# Patient Record
Sex: FEMALE | Race: OTHER | NOT HISPANIC OR LATINO
[De-identification: names, ages, dates, MRNs, and addresses within clinical notes are randomized per-mention and may not be internally consistent; named-entity substitution may affect disease eponyms.]

---

## 2017-06-02 ENCOUNTER — APPOINTMENT (OUTPATIENT)
Dept: ORTHOPEDIC SURGERY | Facility: CLINIC | Age: 52
End: 2017-06-02

## 2017-06-02 VITALS
DIASTOLIC BLOOD PRESSURE: 70 MMHG | WEIGHT: 122 LBS | BODY MASS INDEX: 21.62 KG/M2 | HEART RATE: 67 BPM | SYSTOLIC BLOOD PRESSURE: 103 MMHG | HEIGHT: 63 IN

## 2017-06-02 DIAGNOSIS — Z82.61 FAMILY HISTORY OF ARTHRITIS: ICD-10-CM

## 2017-06-02 DIAGNOSIS — M77.8 OTHER ENTHESOPATHIES, NOT ELSEWHERE CLASSIFIED: ICD-10-CM

## 2017-06-02 RX ORDER — CYANOCOBALAMIN (VITAMIN B-12) 1000 MCG
TABLET ORAL
Refills: 0 | Status: ACTIVE | COMMUNITY

## 2017-06-02 RX ORDER — ERGOCALCIFEROL 1.25 MG/1
1.25 MG CAPSULE, LIQUID FILLED ORAL
Qty: 4 | Refills: 0 | Status: ACTIVE | COMMUNITY
Start: 2016-12-21

## 2018-01-19 ENCOUNTER — EMERGENCY (EMERGENCY)
Facility: HOSPITAL | Age: 53
LOS: 1 days | Discharge: HOME | End: 2018-01-19
Attending: EMERGENCY MEDICINE | Admitting: EMERGENCY MEDICINE
Payer: COMMERCIAL

## 2018-01-19 VITALS
DIASTOLIC BLOOD PRESSURE: 74 MMHG | RESPIRATION RATE: 18 BRPM | TEMPERATURE: 100 F | OXYGEN SATURATION: 98 % | SYSTOLIC BLOOD PRESSURE: 122 MMHG | HEART RATE: 50 BPM

## 2018-01-19 PROCEDURE — 99284 EMERGENCY DEPT VISIT MOD MDM: CPT

## 2018-01-19 PROCEDURE — 99283 EMERGENCY DEPT VISIT LOW MDM: CPT | Mod: 25

## 2018-01-19 PROCEDURE — 71046 X-RAY EXAM CHEST 2 VIEWS: CPT

## 2018-01-19 PROCEDURE — 71046 X-RAY EXAM CHEST 2 VIEWS: CPT | Mod: 26

## 2018-01-19 RX ORDER — IBUPROFEN 200 MG
600 TABLET ORAL ONCE
Qty: 0 | Refills: 0 | Status: COMPLETED | OUTPATIENT
Start: 2018-01-19 | End: 2018-01-19

## 2018-01-19 RX ORDER — ACETAMINOPHEN 500 MG
975 TABLET ORAL ONCE
Qty: 0 | Refills: 0 | Status: COMPLETED | OUTPATIENT
Start: 2018-01-19 | End: 2018-01-19

## 2018-01-19 NOTE — ED PROVIDER NOTE - PHYSICAL EXAMINATION
NAD, VSS, + Febrile, No sinus tender, Normal throat, No lymphadenopathy, Lungs clear, Neuro- intact.

## 2018-01-19 NOTE — ED PROVIDER NOTE - OBJECTIVE STATEMENT
51yo female pt with PMHx of Asthma (No hospitalization) c/o cough, sinus pressure and fever since 2days ago. Pt stated mild cold symptoms with cough started on 1/4/18 and lasting about 1 week and she was evaluated by PMD (No meds). The symptoms restarted with fever (100.1) on last Tuesday and on Tamiflu with improvement by PMD since 2days ago. Today she felt fever again (100) with sinus pressure. Never took Tylenol or Motrin. Denies dizziness or N/V. Denies CP/SOB/ wheezing/ABD pain. Denies sensory changes or weakness to extremities. Denies urinary problems.

## 2018-01-19 NOTE — ED PROVIDER NOTE - ATTENDING CONTRIBUTION TO CARE
Attending MD Quintana: I personally have seen and examined this patient.  NP note reviewed and agree on plan of care and except where noted.  See below for details.    52F with PMH asthma presents to the ED with cough and fever.  Reports that initially these symptoms started 1/4/18 with sore throat, cough, runny nose.  Reports thought it was a cold that resolved without intervention in a few days.  Reports saw PMD who did not prescribe anything then.  Reports this Tuesday developed fever, worsening cough.  Reports went to PMD on Wednesday who prescribed Tamiflu and she has been taking the medication since.  Reports that today she felt sinus pressure, continued with nonproductive cough and had fever.  Denies wheezing.  Denies shortness of breath.  Denies ever intubation or hospitalization for asthma.  Reports this does not feel like her asthma exacerbations.  Reports that the reason she came in today is because she initially felt better after getting her first dose of Tamiflu but then she thinks she worsened since then so wants to make sure she actually has the flu.  Denies dysuria, hematuria, change in urinary habits including frequency, urgency. Denies abdominal pain, nausea, vomiting, diarrhea, blood in stools. Denies chest pain, palpitations. On exam, NAD, head NCAT, PERRL, FROM at neck, no tenderness to palpation or stepoffs along length of spine, lungs mostly CTAB with rare crackle with good inspiratory effort, +S1S2, no m/r/g, abdomen soft with +BS, NT, moving all extremities; A/P: 52F with suspected viral URI.  Explained given that she is already on Tamiflu, RVP will not change my management of her case and so will not be obtaining.  Explained will obtain CXR to make sure no underlying PNA although low suspicion.  Offered nebs, declined, reports does not feel like her asthma is exacerbated at this time.  Explained that if CXR she should continue her course of Tamiflu as prescribed by her PMD and return to see him in 24-48hrs.  Follow up instructions given, importance of follow up emphasized, return to ED parameters reviewed and patient verbalized understanding.

## 2018-07-11 ENCOUNTER — APPOINTMENT (OUTPATIENT)
Dept: SURGERY | Facility: CLINIC | Age: 53
End: 2018-07-11
Payer: COMMERCIAL

## 2018-07-11 PROCEDURE — 99205K: CUSTOM

## 2018-07-31 ENCOUNTER — APPOINTMENT (OUTPATIENT)
Dept: SURGICAL ONCOLOGY | Facility: CLINIC | Age: 53
End: 2018-07-31

## 2018-08-22 ENCOUNTER — APPOINTMENT (OUTPATIENT)
Dept: RADIATION ONCOLOGY | Facility: CLINIC | Age: 53
End: 2018-08-22
Payer: COMMERCIAL

## 2018-08-22 ENCOUNTER — OUTPATIENT (OUTPATIENT)
Dept: OUTPATIENT SERVICES | Facility: HOSPITAL | Age: 53
LOS: 1 days | Discharge: HOME | End: 2018-08-22
Payer: COMMERCIAL

## 2018-08-22 VITALS
TEMPERATURE: 97.9 F | OXYGEN SATURATION: 99 % | HEART RATE: 67 BPM | RESPIRATION RATE: 16 BRPM | BODY MASS INDEX: 21.09 KG/M2 | HEIGHT: 63 IN | WEIGHT: 119 LBS | SYSTOLIC BLOOD PRESSURE: 111 MMHG | DIASTOLIC BLOOD PRESSURE: 73 MMHG

## 2018-08-22 PROCEDURE — 77263 THER RADIOLOGY TX PLNG CPLX: CPT

## 2018-08-22 PROCEDURE — 99244 OFF/OP CNSLTJ NEW/EST MOD 40: CPT | Mod: 25

## 2018-08-22 RX ORDER — ALBUTEROL SULFATE 108 UG/1
108 (90 BASE) AEROSOL, METERED RESPIRATORY (INHALATION)
Qty: 67 | Refills: 0 | Status: DISCONTINUED | COMMUNITY
Start: 2017-04-13 | End: 2018-08-22

## 2018-08-22 RX ORDER — TERCONAZOLE 8 MG/G
0.8 CREAM VAGINAL
Qty: 20 | Refills: 0 | Status: DISCONTINUED | COMMUNITY
Start: 2016-12-20 | End: 2018-08-22

## 2018-08-22 RX ORDER — CLONAZEPAM 0.5 MG/1
0.5 TABLET ORAL
Qty: 10 | Refills: 0 | Status: DISCONTINUED | COMMUNITY
Start: 2016-12-15 | End: 2018-08-22

## 2018-08-22 RX ORDER — OSELTAMIVIR PHOSPHATE 75 MG/1
75 CAPSULE ORAL
Qty: 10 | Refills: 0 | Status: DISCONTINUED | COMMUNITY
Start: 2016-12-07 | End: 2018-08-22

## 2018-08-22 RX ORDER — FLUCONAZOLE 150 MG/1
150 TABLET ORAL
Qty: 2 | Refills: 0 | Status: DISCONTINUED | COMMUNITY
Start: 2016-12-20 | End: 2018-08-22

## 2018-08-22 RX ORDER — CIPROFLOXACIN HYDROCHLORIDE 500 MG/1
500 TABLET, FILM COATED ORAL
Qty: 20 | Refills: 0 | Status: DISCONTINUED | COMMUNITY
Start: 2016-12-15 | End: 2018-08-22

## 2018-08-29 PROCEDURE — 77333 RADIATION TREATMENT AID(S): CPT | Mod: 26

## 2018-08-29 PROCEDURE — 77290 THER RAD SIMULAJ FIELD CPLX: CPT | Mod: 26

## 2018-09-05 PROCEDURE — 77334 RADIATION TREATMENT AID(S): CPT | Mod: 26

## 2018-09-05 PROCEDURE — 77295 3-D RADIOTHERAPY PLAN: CPT | Mod: 26

## 2018-09-05 PROCEDURE — 77300 RADIATION THERAPY DOSE PLAN: CPT | Mod: 26

## 2018-09-07 PROCEDURE — 77280 THER RAD SIMULAJ FIELD SMPL: CPT | Mod: 26

## 2018-09-10 PROCEDURE — G6017: CPT

## 2018-09-11 PROCEDURE — G6017: CPT

## 2018-09-12 PROCEDURE — G6017: CPT

## 2018-09-13 PROCEDURE — G6017: CPT

## 2018-09-13 NOTE — REVIEW OF SYSTEMS
[Alopecia: Grade 0] : Alopecia: Grade 0 [Pruritus: Grade 0] : Pruritus: Grade 0 [Skin Atrophy: Grade 0] : Skin Atrophy: Grade 0 [Skin Hyperpigmentation: Grade 0] : Skin Hyperpigmentation: Grade 0 [Skin Induration: Grade 0] : Skin Induration: Grade 0 [Dermatitis Radiation: Grade 0] : Dermatitis Radiation: Grade 0

## 2018-09-14 PROCEDURE — G6017: CPT

## 2018-09-14 PROCEDURE — 77427 RADIATION TX MANAGEMENT X5: CPT

## 2018-09-16 NOTE — DISEASE MANAGEMENT
[Pathological] : TNM Stage: p [IA] : IA [TTNM] : 1c [NTNM] : 0 [MTNM] : 0 [de-identified] : 1050 cGy [de-identified] : 5240 cGy [de-identified] : left breast

## 2018-09-16 NOTE — PHYSICAL EXAM
[Normal] : well developed, well nourished, in no acute distress [de-identified] : left breast lumpectomy scar well-healed, minimal erythema

## 2018-09-16 NOTE — REASON FOR VISIT
[Routine On-Treatment] : a routine on-treatment visit for [Breast Cancer] : breast cancer [Spouse] : spouse

## 2018-09-16 NOTE — HISTORY OF PRESENT ILLNESS
[FreeTextEntry1] : Ms. Loya is a 53 year-old woman with stage IA, [id number] grade 3 mammary carcinoma with mixed ductal and lobular features, hormone receptor positive and Oncotype Dx score of 11. She underwent lumpectomy with negative margins and had a negative sentinel lymph node biopsy. She is currently receiving adjuvant radiation therapy.\par \par She denies breast pain, but endorses an intermittent tingling sensation. She also endorses fatigue. She denies pruritus and discharge. She is using Aquaphor on the skin.\par \par

## 2018-09-17 ENCOUNTER — OUTPATIENT (OUTPATIENT)
Dept: OUTPATIENT SERVICES | Facility: HOSPITAL | Age: 53
LOS: 1 days | Discharge: HOME | End: 2018-09-17
Payer: COMMERCIAL

## 2018-09-17 DIAGNOSIS — C50.919 MALIGNANT NEOPLASM OF UNSPECIFIED SITE OF UNSPECIFIED FEMALE BREAST: ICD-10-CM

## 2018-09-17 PROCEDURE — G6017: CPT

## 2018-09-18 ENCOUNTER — OTHER (OUTPATIENT)
Age: 53
End: 2018-09-18

## 2018-09-18 PROCEDURE — G6017: CPT

## 2018-09-19 PROCEDURE — G6017: CPT

## 2018-09-20 ENCOUNTER — OTHER (OUTPATIENT)
Age: 53
End: 2018-09-20

## 2018-09-20 PROCEDURE — G6017: CPT

## 2018-09-20 NOTE — VITALS
[Maximal Pain Intensity: 0/10] : 0/10 [Least Pain Intensity: 0/10] : 0/10 [Pain Description/Quality: ___] : Pain description/quality: [unfilled] [NoTreatment Scheduled] : no treatment scheduled [90: Able to carry normal activity; minor signs or symptoms of disease.] : 90: Able to carry normal activity; minor signs or symptoms of disease.  [ECOG Performance Status: 0 - Fully active, able to carry on all pre-disease performance without restriction] : Performance Status: 0 - Fully active, able to carry on all pre-disease performance without restriction

## 2018-09-21 ENCOUNTER — RESULT REVIEW (OUTPATIENT)
Age: 53
End: 2018-09-21

## 2018-09-21 PROCEDURE — 88321 CONSLTJ&REPRT SLD PREP ELSWR: CPT

## 2018-09-21 PROCEDURE — 77427 RADIATION TX MANAGEMENT X5: CPT

## 2018-09-21 PROCEDURE — G6017: CPT

## 2018-09-21 NOTE — DISEASE MANAGEMENT
[Pathological] : TNM Stage: p [IA] : IA [TTNM] : 1c [NTNM] : 0 [MTNM] : 0 [de-identified] : [id number] [de-identified] : 5240 cGy [de-identified] : left breast

## 2018-09-21 NOTE — HISTORY OF PRESENT ILLNESS
[FreeTextEntry1] : Ms. Loya is a 53 year-old woman with stage IA, [id number] grade 3 mammary carcinoma with mixed ductal and lobular features, hormone receptor positive and Oncotype Dx score of 11. She underwent lumpectomy with negative margins and had a negative sentinel lymph node biopsy. She is currently receiving adjuvant radiation therapy.\par \par She reports intermittent sharp pain in her breast, as well as breast swelling and skin irritation. She also reports occasional numbness in her left upper arm. Her fatigue is improved compared to last week. She is very worried about getting lymphedema.\par \par \par

## 2018-09-21 NOTE — REVIEW OF SYSTEMS
[Alopecia: Grade 0] : Alopecia: Grade 0 [Pruritus: Grade 0] : Pruritus: Grade 0 [Skin Atrophy: Grade 0] : Skin Atrophy: Grade 0 [Skin Hyperpigmentation: Grade 1 - Hyperpigmentation covering <10% BSA; no psychosocial impact] : Skin Hyperpigmentation: Grade 1 - Hyperpigmentation covering <10% BSA; no psychosocial impact [Skin Induration: Grade 0] : Skin Induration: Grade 0 [Dermatitis Radiation: Grade 1 - Faint erythema or dry desquamation] : Dermatitis Radiation: Grade 1 - Faint erythema or dry desquamation

## 2018-09-21 NOTE — PHYSICAL EXAM
[Normal] : well developed, well nourished, in no acute distress [de-identified] : left breast with mild skin erythema and dryness that is mildly tender to the touch, no rash or desquamation [de-identified] : a

## 2018-09-24 ENCOUNTER — APPOINTMENT (OUTPATIENT)
Dept: HEMATOLOGY ONCOLOGY | Facility: CLINIC | Age: 53
End: 2018-09-24
Payer: COMMERCIAL

## 2018-09-24 VITALS
OXYGEN SATURATION: 100 % | SYSTOLIC BLOOD PRESSURE: 110 MMHG | RESPIRATION RATE: 16 BRPM | HEART RATE: 73 BPM | WEIGHT: 120 LBS | TEMPERATURE: 98.2 F | HEIGHT: 62.5 IN | DIASTOLIC BLOOD PRESSURE: 67 MMHG | BODY MASS INDEX: 21.53 KG/M2

## 2018-09-24 DIAGNOSIS — L58.9 RADIODERMATITIS, UNSPECIFIED: ICD-10-CM

## 2018-09-24 DIAGNOSIS — Z83.3 FAMILY HISTORY OF DIABETES MELLITUS: ICD-10-CM

## 2018-09-24 DIAGNOSIS — Z80.9 FAMILY HISTORY OF MALIGNANT NEOPLASM, UNSPECIFIED: ICD-10-CM

## 2018-09-24 DIAGNOSIS — Z80.0 FAMILY HISTORY OF MALIGNANT NEOPLASM OF DIGESTIVE ORGANS: ICD-10-CM

## 2018-09-24 PROCEDURE — G6017: CPT

## 2018-09-24 PROCEDURE — 99245 OFF/OP CONSLTJ NEW/EST HI 55: CPT

## 2018-09-24 RX ORDER — CLOTRIMAZOLE AND BETAMETHASONE DIPROPIONATE 10; .5 MG/G; MG/G
1-0.05 CREAM TOPICAL
Qty: 15 | Refills: 0 | Status: DISCONTINUED | COMMUNITY
Start: 2017-03-13 | End: 2018-09-24

## 2018-09-25 PROCEDURE — G6017: CPT

## 2018-09-25 PROCEDURE — 77280 THER RAD SIMULAJ FIELD SMPL: CPT | Mod: 26

## 2018-09-26 PROCEDURE — G6017: CPT

## 2018-09-27 PROCEDURE — G6017: CPT

## 2018-09-27 NOTE — VITALS
[Maximal Pain Intensity: 3/10] : 3/10 [Least Pain Intensity: 0/10] : 0/10 [Pain Description/Quality: ___] : Pain description/quality: [unfilled] [Pain Duration: ___] : Pain duration: [unfilled] [Pain Location: ___] : Pain Location: [unfilled] [Pain Interferes with ADLs] : Pain interferes with activities of daily living. [NoTreatment Scheduled] : no treatment scheduled [90: Able to carry normal activity; minor signs or symptoms of disease.] : 90: Able to carry normal activity; minor signs or symptoms of disease.  [ECOG Performance Status: 0 - Fully active, able to carry on all pre-disease performance without restriction] : Performance Status: 0 - Fully active, able to carry on all pre-disease performance without restriction

## 2018-09-28 PROCEDURE — 77427 RADIATION TX MANAGEMENT X5: CPT

## 2018-09-28 PROCEDURE — G6017: CPT

## 2018-09-29 NOTE — HISTORY OF PRESENT ILLNESS
[FreeTextEntry1] : Ms. Loya is a 53 year-old woman with stage IA, [id number] grade 3 mammary carcinoma with mixed ductal and lobular features, hormone receptor positive and Oncotype Dx score of 11. She underwent lumpectomy with negative margins and had a negative sentinel lymph node biopsy. She is currently receiving adjuvant radiation therapy.\par \par She reports intermittent sharp pain in her breast, as well as breast swelling and skin irritation. She is using Hydrocortisone cream to the rash on the upper chest. She also reports occasional numbness in her left upper arm. Her fatigue is improved compared to last week. \par \par \par

## 2018-09-29 NOTE — DISEASE MANAGEMENT
[Pathological] : TNM Stage: p [IA] : IA [TTNM] : 1c [NTNM] : 0 [MTNM] : 0 [de-identified] : [id number] [de-identified] : 5240 cGy [de-identified] : left breast

## 2018-09-29 NOTE — PHYSICAL EXAM
[Normal] : well developed, well nourished, in no acute distress [de-identified] : left breast with mild to moderate skin erythema and dryness that is mildly tender to the touch, no rash or desquamation

## 2018-10-01 PROCEDURE — G6017: CPT

## 2018-10-02 PROCEDURE — 77321 SPECIAL TELETX PORT PLAN: CPT | Mod: 26

## 2018-10-02 PROCEDURE — 77334 RADIATION TREATMENT AID(S): CPT | Mod: 26

## 2018-10-04 DIAGNOSIS — G47.00 INSOMNIA, UNSPECIFIED: ICD-10-CM

## 2018-10-04 NOTE — DISCUSSION/SUMMARY
[Diagnosis Date (year): ____] : Diagnosis Date (year): [unfilled] [Surgery] : Surgery: Yes [Surgery Date(s) (year): ____] : Surgery Date(s) (year): [unfilled] [Radiation] : Radiation: Yes [Body Area Treated: _________] : Body Area Treated: [unfilled] [End Date (year): ____] : End Date (year): [unfilled] [Follow up with Radiation MD in _____] : Follow up with Radiation MD in [unfilled] [Mammogram] : Mammogram [Skin Checks] : skin checks [Fatigue] : Fatigue [Sun screen use] : Sun screen use [Physical activity] : Physical activity [Bridge to Survivorship Breast Cancer] : Bridge to Survivorship Breast Cancer [FreeTextEntry8] : Iman Scott [FreeTextEntry9] : 10/4/18

## 2018-10-04 NOTE — DISEASE MANAGEMENT
[Pathological] : TNM Stage: p [IA] : IA [TTNM] : 1c [NTNM] : 0 [MTNM] : 0 [de-identified] : [id number] [de-identified] : 5240 cGy [de-identified] : left breast

## 2018-10-04 NOTE — VITALS
[Maximal Pain Intensity: 3/10] : 3/10 [Least Pain Intensity: 0/10] : 0/10 [Pain Description/Quality: ___] : Pain description/quality: [unfilled] [Pain Duration: ___] : Pain duration: [unfilled] [Pain Location: ___] : Pain Location: [unfilled] [Pain Interferes with ADLs] : Pain interferes with activities of daily living. [NoTreatment Scheduled] : no treatment scheduled [80: Normal activity with effort; some signs or symptoms of disease.] : 80: Normal activity with effort; some signs or symptoms of disease.  [ECOG Performance Status: 0 - Fully active, able to carry on all pre-disease performance without restriction] : Performance Status: 0 - Fully active, able to carry on all pre-disease performance without restriction

## 2018-10-04 NOTE — REVIEW OF SYSTEMS
[Alopecia: Grade 0] : Alopecia: Grade 0 [Pruritus: Grade 1 - Mild or localized; topical intervention indicated] : Pruritus: Grade 1 - Mild or localized; topical intervention indicated [Skin Atrophy: Grade 0] : Skin Atrophy: Grade 0 [Skin Hyperpigmentation: Grade 1 - Hyperpigmentation covering <10% BSA; no psychosocial impact] : Skin Hyperpigmentation: Grade 1 - Hyperpigmentation covering <10% BSA; no psychosocial impact [Skin Induration: Grade 0] : Skin Induration: Grade 0 [Dermatitis Radiation: Grade 1 - Faint erythema or dry desquamation] : Dermatitis Radiation: Grade 1 - Faint erythema or dry desquamation

## 2018-10-04 NOTE — PHYSICAL EXAM
[Normal] : well developed, well nourished, in no acute distress [de-identified] : left breast with mild to moderate skin erythema, mild hyperpigmentation, mild rash, no desquamation

## 2018-10-04 NOTE — HISTORY OF PRESENT ILLNESS
[FreeTextEntry1] : Ms. Loya is a 53 year-old woman with stage IA, [id number] grade 3 mammary carcinoma with mixed ductal and lobular features, hormone receptor positive and Oncotype Dx score of 11. She underwent lumpectomy with negative margins and had a negative sentinel lymph node biopsy. She is currently receiving adjuvant radiation therapy.\par \par She has been feeling quite fatigue last two days and today has a headache. She had 4 nights of poor sleep this past week, only about 3 hrs per night. Her skin is a little better this week, less red and irritated. She is using Hydrocortisone cream and Aquaphor.

## 2018-10-05 PROCEDURE — 77427 RADIATION TX MANAGEMENT X5: CPT

## 2018-10-11 ENCOUNTER — RESULT REVIEW (OUTPATIENT)
Age: 53
End: 2018-10-11

## 2018-10-11 PROCEDURE — 88321 CONSLTJ&REPRT SLD PREP ELSWR: CPT

## 2018-11-28 ENCOUNTER — APPOINTMENT (OUTPATIENT)
Dept: RADIATION ONCOLOGY | Facility: CLINIC | Age: 53
End: 2018-11-28
Payer: COMMERCIAL

## 2018-11-28 VITALS
HEART RATE: 69 BPM | WEIGHT: 119 LBS | SYSTOLIC BLOOD PRESSURE: 106 MMHG | DIASTOLIC BLOOD PRESSURE: 68 MMHG | BODY MASS INDEX: 21.9 KG/M2 | RESPIRATION RATE: 16 BRPM | HEIGHT: 62 IN

## 2018-11-28 PROCEDURE — 99024 POSTOP FOLLOW-UP VISIT: CPT | Mod: GC

## 2018-11-29 NOTE — HISTORY OF PRESENT ILLNESS
[FreeTextEntry1] : Ms. Loya is a 53 year-old woman with stage IA, [id number] grade 3 mammary carcinoma with mixed ductal and lobular features, hormone receptor positive and Oncotype Dx score of 11. She underwent lumpectomy with negative margins and had a negative sentinel lymph node biopsy. She is s/p 5240 cGy to the left breast completed on 10/5/18. She presents for post treatment visit. \par \par The patient reports feeling generally well. She is involved in her usual activities. She remains active and exercises. She denies cough or shortness of breath.  There has been some improvement in the skin since completing radiation therapy. She continues regular skin care. She does not require analgesics. She had pain in her breast for two day after a yoga class, but it resolved. She started tamoxifen and is tolerating it well, except that she has noted changes in her hair, thinner and different texture. \par

## 2018-11-29 NOTE — DISEASE MANAGEMENT
[Pathological] : TNM Stage: p [IA] : IA [TTNM] : 1c [NTNM] : 0 [MTNM] : 0 [de-identified] : [id number] [de-identified] : 5240 cGy [de-identified] : left breast

## 2018-11-29 NOTE — PHYSICAL EXAM
[Breast Palpation Mass] : no palpable masses [Breast Abnormal Lactation (Galactorrhea)] : no nipple discharge [No UE Edema] : there is no upper extremity edema [Normal] : no joint swelling, no clubbing or cyanosis of the fingernails and muscle strength and tone were normal [de-identified] : left breast well healed scars without edema, mild residual hyperpigmentation and edema

## 2018-11-29 NOTE — VITALS
[Maximal Pain Intensity: 3/10] : 3/10 [Least Pain Intensity: 0/10] : 0/10 [Pain Description/Quality: ___] : Pain description/quality: [unfilled] [Pain Duration: ___] : Pain duration: [unfilled] [Pain Location: ___] : Pain Location: [unfilled] [NoTreatment Scheduled] : no treatment scheduled [90: Able to carry normal activity; minor signs or symptoms of disease.] : 90: Able to carry normal activity; minor signs or symptoms of disease.  [ECOG Performance Status: 0 - Fully active, able to carry on all pre-disease performance without restriction] : Performance Status: 0 - Fully active, able to carry on all pre-disease performance without restriction [Pain Interferes with ADLs] : Pain does not interfere with activities of daily living

## 2018-11-30 ENCOUNTER — MESSAGE (OUTPATIENT)
Age: 53
End: 2018-11-30

## 2018-12-04 ENCOUNTER — OUTPATIENT (OUTPATIENT)
Dept: OUTPATIENT SERVICES | Facility: HOSPITAL | Age: 53
LOS: 1 days | Discharge: HOME | End: 2018-12-04

## 2018-12-04 DIAGNOSIS — C50.919 MALIGNANT NEOPLASM OF UNSPECIFIED SITE OF UNSPECIFIED FEMALE BREAST: ICD-10-CM

## 2018-12-07 ENCOUNTER — APPOINTMENT (OUTPATIENT)
Dept: HEMATOLOGY ONCOLOGY | Facility: CLINIC | Age: 53
End: 2018-12-07
Payer: COMMERCIAL

## 2018-12-07 VITALS
TEMPERATURE: 98.3 F | HEART RATE: 72 BPM | BODY MASS INDEX: 21.57 KG/M2 | DIASTOLIC BLOOD PRESSURE: 73 MMHG | SYSTOLIC BLOOD PRESSURE: 110 MMHG | RESPIRATION RATE: 17 BRPM | OXYGEN SATURATION: 99 % | WEIGHT: 117.95 LBS

## 2018-12-07 DIAGNOSIS — R20.0 ANESTHESIA OF SKIN: ICD-10-CM

## 2018-12-07 DIAGNOSIS — L65.9 NONSCARRING HAIR LOSS, UNSPECIFIED: ICD-10-CM

## 2018-12-07 PROCEDURE — 99215 OFFICE O/P EST HI 40 MIN: CPT

## 2018-12-07 RX ORDER — FEXOFENADINE HYDROCHLORIDE 180 MG/1
180 TABLET, FILM COATED ORAL
Refills: 0 | Status: ACTIVE | COMMUNITY

## 2018-12-07 RX ORDER — WHITE PETROLATUM 1.75 OZ
OINTMENT TOPICAL
Refills: 0 | Status: DISCONTINUED | COMMUNITY
Start: 2018-09-24 | End: 2018-12-07

## 2018-12-07 NOTE — REVIEW OF SYSTEMS
[Anxiety] : anxiety [Negative] : Allergic/Immunologic [FreeTextEntry2] : More fatigue, wakes up feeling she needs more sleep. had flu vaccination 10/23/2018. [FreeTextEntry7] : Most recent colonoscopy 2014 [FreeTextEntry8] : LMP 10  days ago, last period was 1 week early. Most recent GYN exam 7/2018. [de-identified] : Hair loss, see interval history. [de-identified] : Intermittent numbness right leg  started a few days ago. Also had transitory numbness of right arm at different times.

## 2018-12-07 NOTE — PHYSICAL EXAM
[Fully active, able to carry on all pre-disease performance without restriction] : Status 0 - Fully active, able to carry on all pre-disease performance without restriction [Normal] : affect appropriate [Thin] : thin [de-identified] : Right breast: no mass. Left breast: healing incisions inferior margin areola and left axilla, tender, mild hyperpigmentation, no mass. [de-identified] : No gross abnormalities in hair.

## 2018-12-07 NOTE — HISTORY OF PRESENT ILLNESS
[Disease: _____________________] : Disease: [unfilled] [T: ___] : T[unfilled] [N: ___] : N[unfilled] [M: ___] : M[unfilled] [AJCC Stage: ____] : AJCC Stage: [unfilled] [Date: ____________] : Patient's last distress assessment performed on [unfilled]. [10 - Extreme Distress] : Distress Level: 10 [Referred Patient  to social work for follow-up] : Patient was referred to social work for follow-up [de-identified] : The patient presented in 2018, at age 52, with an abnormal screening mammogram.\par \par Routine mammogram/breast ultrasound performed on 2018 at [facility] demonstrated distortion in the left  retroareolar region which corresponded to a 0.8 cm hypoechoic nodule at the 4:00 position of the left breast. Ultrasound-guided core biopsy was performed on 2018 and was positive for infiltrating carcinoma with ductal and lobular features, Panaca score 7/9, ER positive (%), CT positive (%) HER-2/belem 2+ by IHC and HER-2/belem negative by FISH.\par \par Breast MRI on 7/10/2018 demonstrated a 1.2 cm mass in the left breast at the site of her known cancer and was negative for other suspicious findings\par \par Dr. Ella Herrera performed a left lumpectomy/sentinel lymph node biopsy at [facility] on 2018. Pathology reported a 20 mm moderately to poorly differentiated mixed ductal and lobular infiltrating carcinoma with 3 negative sentinel lymph nodes. The final margins were negative. Oncotype DX recurrence score was 11. The patient had an uneventful postoperative course.\par \par The patient initiated radiation therapy under the supervision of Dr. Latrice Nation on 9/10/2018. \par \par Risk factors:\par Prior breast disease: None, no biopsies. Menarche: Age 15. The patient is premenopausal with her LMP 2 weeks prior to this consultation, and her  4 weeks before that. She has not experienced any vasomotor symptoms. The patient is  2 para 2002 with her first childbirth at age 34. She breast-fed both her children for 4 months. Oral contraceptive use: Age 22 or 23 for approximately one year. IUD: None. Other hormone exposure: None. Family history is negative for cancer of the breast, ovary, endometrium, and prostate. Family history is positive for colorectal cancer in her maternal grandmother (age 72) and maternal aunt (age 54). The patient is not aware of any personal or family history of colon polyps. The patient patient's maternal grandfather had "stomach cancer" in his early 60s. The patient's father who, although a nonsmoker, was exposed to passive cigarette smoke, developed lung cancer in his early 60s. A paternal aunt possibly had melanoma in her 70s. Her paternal grandfather  of an unknown type of cancer in his early 60s. The patient is Russian ethnic background on the paternal side and an Ashkenazi Scientologist background on the maternal side. The patient underwent genetic counseling and testing at [facility] which was negative for deleterious mutations and variants. The testing done by [facility] consisted of their Breast Cancer STAT Panel, Breast and Gyn Cancers Panel, Colorectal Cancers Panel, DARYN and CHEK2. [de-identified] : Infiltrating carcinoma with ductal and lobular features, moderately to poorly differentiated, ER positive, DE positive, HER-2/belem negative, Oncotype DX recurrence score 11. [de-identified] : The patient is 5 months post diagnosis of breast cancer.\par \par She completed RT  10/5/2018. Last saw Dr Nation 11/28/2018.\par \par After delay to take a trip she initiated tamoxifen 3 weeks ago. Feels mostly fine except that starting 1 week post initiation of tamoxifen she felt hair was significant thinning.\par \par Feels unusually tired.\par \par Very anxious. patient states she is a fearful person.

## 2018-12-14 ENCOUNTER — MESSAGE (OUTPATIENT)
Age: 53
End: 2018-12-14

## 2018-12-17 ENCOUNTER — RX RENEWAL (OUTPATIENT)
Age: 53
End: 2018-12-17

## 2018-12-19 ENCOUNTER — MESSAGE (OUTPATIENT)
Age: 53
End: 2018-12-19

## 2019-01-07 ENCOUNTER — INBOUND DOCUMENT (OUTPATIENT)
Age: 54
End: 2019-01-07

## 2019-01-09 ENCOUNTER — OUTPATIENT (OUTPATIENT)
Dept: OUTPATIENT SERVICES | Facility: HOSPITAL | Age: 54
LOS: 1 days | Discharge: HOME | End: 2019-01-09

## 2019-01-09 DIAGNOSIS — C50.919 MALIGNANT NEOPLASM OF UNSPECIFIED SITE OF UNSPECIFIED FEMALE BREAST: ICD-10-CM

## 2019-01-24 ENCOUNTER — APPOINTMENT (OUTPATIENT)
Dept: HEMATOLOGY ONCOLOGY | Facility: CLINIC | Age: 54
End: 2019-01-24
Payer: COMMERCIAL

## 2019-01-24 VITALS
HEART RATE: 73 BPM | DIASTOLIC BLOOD PRESSURE: 74 MMHG | RESPIRATION RATE: 16 BRPM | WEIGHT: 120 LBS | OXYGEN SATURATION: 100 % | BODY MASS INDEX: 21.95 KG/M2 | TEMPERATURE: 98.2 F | SYSTOLIC BLOOD PRESSURE: 116 MMHG

## 2019-01-24 PROCEDURE — 99214 OFFICE O/P EST MOD 30 MIN: CPT

## 2019-01-24 NOTE — HISTORY OF PRESENT ILLNESS
[Disease: _____________________] : Disease: [unfilled] [T: ___] : T[unfilled] [N: ___] : N[unfilled] [M: ___] : M[unfilled] [AJCC Stage: ____] : AJCC Stage: [unfilled] [de-identified] : The patient presented in 2018, at age 52, with an abnormal screening mammogram.\par \par Routine mammogram/breast ultrasound performed on 2018 at [facility] demonstrated distortion in the left  retroareolar region which corresponded to a 0.8 cm hypoechoic nodule at the 4:00 position of the left breast. Ultrasound-guided core biopsy was performed on 2018 and was positive for infiltrating carcinoma with ductal and lobular features, McQueeney score 7/9, ER positive (%), TX positive (%) HER-2/belem 2+ by IHC and HER-2/belem negative by FISH.\par \par Breast MRI on 7/10/2018 demonstrated a 1.2 cm mass in the left breast at the site of her known cancer and was negative for other suspicious findings\par \par Dr. Ella Herrera performed a left lumpectomy/sentinel lymph node biopsy at [facility] on 2018. Pathology reported a 20 mm moderately to poorly differentiated mixed ductal and lobular infiltrating carcinoma with 3 negative sentinel lymph nodes. The final margins were negative. Oncotype DX recurrence score was 11. The patient had an uneventful postoperative course.\par \par The patient initiated radiation therapy under the supervision of Dr. Latrice Nation on 9/10/2018. \par \par Risk factors:\par Prior breast disease: None, no biopsies. Menarche: Age 15. The patient is premenopausal with her LMP 2 weeks prior to this consultation, and her  4 weeks before that. She has not experienced any vasomotor symptoms. The patient is  2 para 2002 with her first childbirth at age 34. She breast-fed both her children for 4 months. Oral contraceptive use: Age 22 or 23 for approximately one year. IUD: None. Other hormone exposure: None. Family history is negative for cancer of the breast, ovary, endometrium, and prostate. Family history is positive for colorectal cancer in her maternal grandmother (age 72) and maternal aunt (age 54). The patient is not aware of any personal or family history of colon polyps. The patient patient's maternal grandfather had "stomach cancer" in his early 60s. The patient's father who, although a nonsmoker, was exposed to passive cigarette smoke, developed lung cancer in his early 60s. A paternal aunt possibly had melanoma in her 70s. Her paternal grandfather  of an unknown type of cancer in his early 60s. The patient is Stateless ethnic background on the paternal side and an Ashkenazi Alevism background on the maternal side. The patient underwent genetic counseling and testing at [facility] which was negative for deleterious mutations and variants. The testing done by [facility] consisted of their Breast Cancer STAT Panel, Breast and Gyn Cancers Panel, Colorectal Cancers Panel, DARYN and CHEK2. [de-identified] : Infiltrating carcinoma with ductal and lobular features, moderately to poorly differentiated, ER positive, NM positive, HER-2/belem negative, Oncotype DX recurrence score 11. [de-identified] : The patient is 6  months plus  post diagnosis of breast cancer.\par \par Patient discontinued tamoxifen after last visit because of of numbness, was referred t o neurology who felt she did not have TIA or CVA and cleared her to resume tamoxifen.\par \par Saw neurology Dr Robert Henry in neurology regarding transient episode of numbness, contrast brain MRI was ordered.\par \par Resumed it for 10 days and then discontinued it because of trip. REsumed tamoxifen 1/7/2019. Planning to fly to [place] 3/2019.\par \par Feels unusually tired.\par \par Very anxious. patient states she is a fearful person.\par \par Increased moodiness. Saw psychiatrist who recommended Effexor or Lexapro as a second choice.\par \par No other interval events since last seen.

## 2019-01-24 NOTE — PHYSICAL EXAM
[Fully active, able to carry on all pre-disease performance without restriction] : Status 0 - Fully active, able to carry on all pre-disease performance without restriction [Thin] : thin [Normal] : normal appearance, no rash, nodules, vesicles, ulcers, erythema [de-identified] : Right breast: no mass. Left breast:  incisions inferior margin areola and left axilla,, no mass.

## 2019-01-24 NOTE — REVIEW OF SYSTEMS
[Anxiety] : anxiety [Negative] : Allergic/Immunologic [FreeTextEntry2] : Energy level normal, still wakes up at night..Had flu vaccination 10/23/2018. [FreeTextEntry7] : Most recent colonoscopy 2014 [FreeTextEntry8] : LMP late 12/2018. No vasomotor symptoms Most recent GYN exam 7/2018. [de-identified] : Hair loss " a nightmare". [de-identified] : See interval history.

## 2019-02-28 ENCOUNTER — OUTPATIENT (OUTPATIENT)
Dept: OUTPATIENT SERVICES | Facility: HOSPITAL | Age: 54
LOS: 1 days | Discharge: HOME | End: 2019-02-28

## 2019-02-28 DIAGNOSIS — C50.919 MALIGNANT NEOPLASM OF UNSPECIFIED SITE OF UNSPECIFIED FEMALE BREAST: ICD-10-CM

## 2019-03-14 ENCOUNTER — APPOINTMENT (OUTPATIENT)
Dept: HEMATOLOGY ONCOLOGY | Facility: CLINIC | Age: 54
End: 2019-03-14
Payer: COMMERCIAL

## 2019-03-14 VITALS
DIASTOLIC BLOOD PRESSURE: 76 MMHG | TEMPERATURE: 98.2 F | WEIGHT: 125 LBS | RESPIRATION RATE: 16 BRPM | SYSTOLIC BLOOD PRESSURE: 139 MMHG | HEART RATE: 72 BPM | OXYGEN SATURATION: 99 % | BODY MASS INDEX: 22.86 KG/M2

## 2019-03-14 DIAGNOSIS — R10.31 RIGHT LOWER QUADRANT PAIN: ICD-10-CM

## 2019-03-14 PROCEDURE — 99214 OFFICE O/P EST MOD 30 MIN: CPT

## 2019-03-14 RX ORDER — CLONAZEPAM 0.5 MG/1
0.5 TABLET ORAL 3 TIMES DAILY
Qty: 30 | Refills: 0 | Status: DISCONTINUED | COMMUNITY
Start: 2018-10-04 | End: 2019-03-14

## 2019-03-14 NOTE — REVIEW OF SYSTEMS
[Anxiety] : anxiety [Negative] : Allergic/Immunologic [FreeTextEntry2] : Energy decreased, does all normal activites and is exercising more..Had flu vaccination 10/23/2018. [FreeTextEntry7] : Most recent colonoscopy 2014 [FreeTextEntry8] : LMP late 2/2019,  late 12/2018. No vasomotor symptoms Most recent GYN exam 7/2018. [de-identified] : Hair still falling out [de-identified] : See interval history.

## 2019-03-14 NOTE — PHYSICAL EXAM
[Fully active, able to carry on all pre-disease performance without restriction] : Status 0 - Fully active, able to carry on all pre-disease performance without restriction [Thin] : thin [Normal] : affect appropriate [de-identified] : Right breast: no mass. Left breast:  incisions inferior margin areola and left axilla,, no mass.

## 2019-03-14 NOTE — HISTORY OF PRESENT ILLNESS
[Disease: _____________________] : Disease: [unfilled] [T: ___] : T[unfilled] [N: ___] : N[unfilled] [M: ___] : M[unfilled] [AJCC Stage: ____] : AJCC Stage: [unfilled] [de-identified] : The patient presented in 2018, at age 52, with an abnormal screening mammogram.\par \par Routine mammogram/breast ultrasound performed on 2018 at [facility] demonstrated distortion in the left  retroareolar region which corresponded to a 0.8 cm hypoechoic nodule at the 4:00 position of the left breast. 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 Ella Herrera performed a left lumpectomy/sentinel lymph node biopsy at [facility] on 2018. Pathology reported a 20 mm moderately to poorly differentiated mixed ductal and lobular infiltrating carcinoma with 3 negative sentinel lymph nodes. The final margins were negative. Oncotype DX recurrence score was 11. The patient had an uneventful postoperative course.\par \par The patient received radiation therapy under the supervision of Dr. Latrice Nation.\par \par Risk factors:\par Prior breast disease: None, no biopsies. Menarche: Age 15. The patient is premenopausal with her LMP 2 weeks prior to this consultation, and her  4 weeks before that. She has not experienced any vasomotor symptoms. The patient is  2 para 2002 with her first childbirth at age 34. She breast-fed both her children for 4 months. Oral contraceptive use: Age 22 or 23 for approximately one year. IUD: None. Other hormone exposure: None. Family history is negative for cancer of the breast, ovary, endometrium, and prostate. Family history is positive for colorectal cancer in her maternal grandmother (age 72) and maternal aunt (age 54). The patient is not aware of any personal or family history of colon polyps. The patient patient's maternal grandfather had "stomach cancer" in his early 60s. The patient's father who, although a nonsmoker, was exposed to passive cigarette smoke, developed lung cancer in his early 60s. A paternal aunt possibly had melanoma in her 70s. Her paternal grandfather  of an unknown type of cancer in his early 60s. The patient is [place] ethnic background on the paternal side and an Ashkenazi Pentec[employer]al background on the maternal side. The patient underwent genetic counseling and testing at [facility] which was negative for deleterious mutations and variants. The testing done by [facility] consisted of their Breast Cancer STAT Panel, Breast and Gyn Cancers Panel, Colorectal Cancers Panel, DARYN and CHEK2. [de-identified] : Infiltrating carcinoma with ductal and lobular features, moderately to poorly differentiated, ER positive, [place] positive, HER-2/belem negative, Oncotype DX recurrence score 11. [FreeTextEntry1] : Tamoxifen, started 11/13/2018. Multiple hiatuses. Resumed tamoxifen 10 mg BID regularly 1/7/2019 [de-identified] : The patient is 8  months plus  post diagnosis of breast cancer.\par \par After stopping tamoxifen several times she resumed tamoxifen 1/7/2019. \par \par Energy level is still low,\par \par Very anxious. patient states it is improved.\par \par Increased moodiness. Saw psychiatrist who recommended Effexor or Lexapro as a second choice.\par \par No other interval events since last seen.

## 2019-04-04 ENCOUNTER — MESSAGE (OUTPATIENT)
Age: 54
End: 2019-04-04

## 2019-04-09 ENCOUNTER — OUTPATIENT (OUTPATIENT)
Dept: OUTPATIENT SERVICES | Facility: HOSPITAL | Age: 54
LOS: 1 days | End: 2019-04-09
Payer: COMMERCIAL

## 2019-04-09 ENCOUNTER — TRANSCRIPTION ENCOUNTER (OUTPATIENT)
Age: 54
End: 2019-04-09

## 2019-04-09 VITALS
DIASTOLIC BLOOD PRESSURE: 78 MMHG | HEIGHT: 63 IN | RESPIRATION RATE: 20 BRPM | HEART RATE: 78 BPM | SYSTOLIC BLOOD PRESSURE: 114 MMHG | WEIGHT: 123.9 LBS | TEMPERATURE: 98 F | OXYGEN SATURATION: 98 %

## 2019-04-09 DIAGNOSIS — N84.0 POLYP OF CORPUS UTERI: ICD-10-CM

## 2019-04-09 DIAGNOSIS — C50.919 MALIGNANT NEOPLASM OF UNSPECIFIED SITE OF UNSPECIFIED FEMALE BREAST: ICD-10-CM

## 2019-04-09 DIAGNOSIS — Z01.818 ENCOUNTER FOR OTHER PREPROCEDURAL EXAMINATION: ICD-10-CM

## 2019-04-09 DIAGNOSIS — Z98.890 OTHER SPECIFIED POSTPROCEDURAL STATES: Chronic | ICD-10-CM

## 2019-04-09 PROCEDURE — G0463: CPT

## 2019-04-09 RX ORDER — LIDOCAINE HCL 20 MG/ML
0.2 VIAL (ML) INJECTION ONCE
Qty: 0 | Refills: 0 | Status: DISCONTINUED | OUTPATIENT
Start: 2019-04-10 | End: 2019-04-25

## 2019-04-09 RX ORDER — OXYCODONE HYDROCHLORIDE 5 MG/1
5 TABLET ORAL ONCE
Qty: 0 | Refills: 0 | Status: DISCONTINUED | OUTPATIENT
Start: 2019-04-10 | End: 2019-04-10

## 2019-04-09 RX ORDER — SODIUM CHLORIDE 9 MG/ML
3 INJECTION INTRAMUSCULAR; INTRAVENOUS; SUBCUTANEOUS EVERY 8 HOURS
Qty: 0 | Refills: 0 | Status: DISCONTINUED | OUTPATIENT
Start: 2019-04-10 | End: 2019-04-25

## 2019-04-09 RX ORDER — ONDANSETRON 8 MG/1
4 TABLET, FILM COATED ORAL ONCE
Qty: 0 | Refills: 0 | Status: DISCONTINUED | OUTPATIENT
Start: 2019-04-10 | End: 2019-04-25

## 2019-04-09 RX ORDER — SODIUM CHLORIDE 9 MG/ML
1000 INJECTION, SOLUTION INTRAVENOUS
Qty: 0 | Refills: 0 | Status: DISCONTINUED | OUTPATIENT
Start: 2019-04-10 | End: 2019-04-25

## 2019-04-09 NOTE — H&P PST ADULT - RS GEN PE MLT RESP DETAILS PC
respirations non-labored/breath sounds equal/good air movement/clear to auscultation bilaterally/normal

## 2019-04-09 NOTE — H&P PST ADULT - NSICDXPASTMEDICALHX_GEN_ALL_CORE_FT
PAST MEDICAL HISTORY:  Asthma mild - only with colds    Breast cancer left - lumpectomy July 2018, , lymph nodes removed , radiation  ,tamoxifen    Endometrial thickening on ultrasound     Uterine polyp

## 2019-04-09 NOTE — H&P PST ADULT - HISTORY OF PRESENT ILLNESS
53 yr old female, A1, with history of Left breast ca (2017) - s/p lumpectomy 2017 - radiation & on Tamoxifen, with abdominal pain, had vaginal ultrasound - that revealed endometrial thickening & uterine polyp, Now coming in for Exam under anesthesia , D & C, Operative hysteroscopy, Removal of endometrial polyp on 4/10/19.

## 2019-04-09 NOTE — H&P PST ADULT - NSANTHOSAYNRD_GEN_A_CORE
No. LITA screening performed.  STOP BANG Legend: 0-2 = LOW Risk; 3-4 = INTERMEDIATE Risk; 5-8 = HIGH Risk

## 2019-04-09 NOTE — H&P PST ADULT - NSICDXPROBLEM_GEN_ALL_CORE_FT
PROBLEM DIAGNOSES  Problem: Uterine polyp  Assessment and Plan:  Exam under anesthesia , D & C, Operative hysteroscopy, Removal of endometrial polyp on 4/10/19. PROBLEM DIAGNOSES  Problem: Uterine polyp  Assessment and Plan:  Exam under anesthesia , D & C, Operative hysteroscopy, Removal of endometrial polyp on 4/10/19.    Problem: Breast cancer  Assessment and Plan: Left arm precautions, No IV, Blood work, BP -left arm

## 2019-04-10 ENCOUNTER — RESULT REVIEW (OUTPATIENT)
Age: 54
End: 2019-04-10

## 2019-04-10 ENCOUNTER — OUTPATIENT (OUTPATIENT)
Dept: OUTPATIENT SERVICES | Facility: HOSPITAL | Age: 54
LOS: 1 days | End: 2019-04-10
Payer: COMMERCIAL

## 2019-04-10 VITALS
TEMPERATURE: 99 F | OXYGEN SATURATION: 98 % | HEART RATE: 60 BPM | DIASTOLIC BLOOD PRESSURE: 56 MMHG | RESPIRATION RATE: 15 BRPM | SYSTOLIC BLOOD PRESSURE: 103 MMHG

## 2019-04-10 VITALS
OXYGEN SATURATION: 98 % | RESPIRATION RATE: 20 BRPM | HEART RATE: 75 BPM | SYSTOLIC BLOOD PRESSURE: 107 MMHG | WEIGHT: 123.9 LBS | TEMPERATURE: 98 F | DIASTOLIC BLOOD PRESSURE: 75 MMHG | HEIGHT: 63 IN

## 2019-04-10 DIAGNOSIS — N84.0 POLYP OF CORPUS UTERI: ICD-10-CM

## 2019-04-10 DIAGNOSIS — Z98.890 OTHER SPECIFIED POSTPROCEDURAL STATES: Chronic | ICD-10-CM

## 2019-04-10 PROBLEM — J45.909 UNSPECIFIED ASTHMA, UNCOMPLICATED: Chronic | Status: ACTIVE | Noted: 2019-04-09

## 2019-04-10 PROBLEM — J45.40 MODERATE PERSISTENT ASTHMA, UNCOMPLICATED: Chronic | Status: INACTIVE | Noted: 2018-01-19 | Resolved: 2019-04-09

## 2019-04-10 PROBLEM — C50.919 MALIGNANT NEOPLASM OF UNSPECIFIED SITE OF UNSPECIFIED FEMALE BREAST: Chronic | Status: ACTIVE | Noted: 2019-04-09

## 2019-04-10 PROCEDURE — 58558 HYSTEROSCOPY BIOPSY: CPT

## 2019-04-10 RX ORDER — CELECOXIB 200 MG/1
200 CAPSULE ORAL ONCE
Qty: 0 | Refills: 0 | Status: COMPLETED | OUTPATIENT
Start: 2019-04-10 | End: 2019-04-10

## 2019-04-10 RX ORDER — ACETAMINOPHEN 500 MG
1000 TABLET ORAL ONCE
Qty: 0 | Refills: 0 | Status: COMPLETED | OUTPATIENT
Start: 2019-04-10 | End: 2019-04-10

## 2019-04-10 RX ORDER — APREPITANT 80 MG/1
40 CAPSULE ORAL ONCE
Qty: 0 | Refills: 0 | Status: COMPLETED | OUTPATIENT
Start: 2019-04-10 | End: 2019-04-10

## 2019-04-10 RX ADMIN — CELECOXIB 200 MILLIGRAM(S): 200 CAPSULE ORAL at 12:03

## 2019-04-10 RX ADMIN — APREPITANT 40 MILLIGRAM(S): 80 CAPSULE ORAL at 10:24

## 2019-04-10 NOTE — BRIEF OPERATIVE NOTE - NSICDXBRIEFPROCEDURE_GEN_ALL_CORE_FT
PROCEDURES:  Hysteroscopy with polypectomy of uterus 10-Apr-2019 11:30:31  Diana Ahn  Hysteroscopic surgical procedure 10-Apr-2019 11:30:29  Diana Ahn  Exam, under anesthesia 10-Apr-2019 11:30:24  Diana Ahn  Dilation and curettage 10-Apr-2019 11:30:19  Diana Ahn

## 2019-04-10 NOTE — ASU DISCHARGE PLAN (ADULT/PEDIATRIC) - ASU DC SPECIAL INSTRUCTIONSFT
MD RAYMUNDO ARECHIGA MD MARINESE DORENE, MD CHUNG HETTY, MD HUNTER TIFFANY, MD AHAMED TARNIMA, MD  PHONE: [phone]  FAX: [phone]    POSTOPERATIVE INSTRUCTIONS FOR HYSTEROSCOPY, ENDOMETRIAL POLYP/FIBROID REMOVAL,D&C    After your surgery it is normal to experience:    •	Vaginal bleeding- can last 1-2 weeks and should not be heavier than a period. It may come and go and be red, brown or pink. Use pads, not tampons.  •	Cramping- Is common especially within the first 24 hours. This should be relieved by taking over the counter motrin, advil or Tylenol.  •	Watery discharge- can be seen for a day or two after hysteroscopy because some of the fluid that is put into the uterus during surgery may continue to leak out for a day or two.  •	Vaginal soreness/irritation- can occur in the first few days after surgery because of the instruments that were used in the vagina. Soreness can be treated with ice pack and irritation can be taken care of with an emollient such as balmex or aquaphor that you can put directly on the irritated area.    Restrictions: For 10-14 days after the surgery you should avoid the following:    •	Tampons  •	Sex  •	Vigorous gym exercise  •	Swimming  pools and tub baths  •	Wait a day or two before going back to work    Anesthesia Precautions:  For the next 12 hours do not:   •	drive a car,  •	 operate power tools or machinery,  •	drink alcohol, beer, or wine,   •	make important personal or business decisions    Diet:   •	Resume Regular diet but Progress diet slowly     Physician Notification- Warning signs to look out for  •	Heavy Vaginal Bleeding   •	Shortness of breath or chest pain  •	Severe Abdominal Pain  •	Persistent nausea and vomiting  •	Pain not relieved by medications  •	Fever greater than 100.5®F  •	Inability to tolerate liquids or foods  •	Unable to urinate after 8 hours    Discharge and Disposition  •	Discharge to home    Follow Up Care:  •	In the event that you develop a complication and you are unable to reach your own physician, you may contact:  [phone] or go to the nearest Emergency Room.   •	Please contact the office to make a follow up appointment [phone]

## 2019-04-10 NOTE — ASU PATIENT PROFILE, ADULT - PMH
Asthma  mild - only with colds  Breast cancer  left - lumpectomy July 2018, , lymph nodes removed , radiation  ,tamoxifen  Endometrial thickening on ultrasound    Uterine polyp

## 2019-04-15 LAB — SURGICAL PATHOLOGY STUDY: SIGNIFICANT CHANGE UP

## 2019-05-31 ENCOUNTER — OUTPATIENT (OUTPATIENT)
Dept: OUTPATIENT SERVICES | Facility: HOSPITAL | Age: 54
LOS: 1 days | Discharge: HOME | End: 2019-05-31

## 2019-05-31 DIAGNOSIS — C50.919 MALIGNANT NEOPLASM OF UNSPECIFIED SITE OF UNSPECIFIED FEMALE BREAST: ICD-10-CM

## 2019-05-31 DIAGNOSIS — Z98.890 OTHER SPECIFIED POSTPROCEDURAL STATES: Chronic | ICD-10-CM

## 2019-05-31 PROBLEM — R93.89 ABNORMAL FINDINGS ON DIAGNOSTIC IMAGING OF OTHER SPECIFIED BODY STRUCTURES: Chronic | Status: ACTIVE | Noted: 2019-04-09

## 2019-05-31 PROBLEM — N84.0 POLYP OF CORPUS UTERI: Chronic | Status: ACTIVE | Noted: 2019-04-09

## 2019-06-05 ENCOUNTER — APPOINTMENT (OUTPATIENT)
Dept: HEMATOLOGY ONCOLOGY | Facility: CLINIC | Age: 54
End: 2019-06-05

## 2019-09-05 ENCOUNTER — OUTPATIENT (OUTPATIENT)
Dept: OUTPATIENT SERVICES | Facility: HOSPITAL | Age: 54
LOS: 1 days | Discharge: HOME | End: 2019-09-05

## 2019-09-05 DIAGNOSIS — Z98.890 OTHER SPECIFIED POSTPROCEDURAL STATES: Chronic | ICD-10-CM

## 2019-09-05 DIAGNOSIS — C50.919 MALIGNANT NEOPLASM OF UNSPECIFIED SITE OF UNSPECIFIED FEMALE BREAST: ICD-10-CM

## 2019-09-06 ENCOUNTER — RESULT REVIEW (OUTPATIENT)
Age: 54
End: 2019-09-06

## 2019-09-06 ENCOUNTER — APPOINTMENT (OUTPATIENT)
Dept: HEMATOLOGY ONCOLOGY | Facility: CLINIC | Age: 54
End: 2019-09-06
Payer: COMMERCIAL

## 2019-09-06 VITALS
HEART RATE: 67 BPM | DIASTOLIC BLOOD PRESSURE: 76 MMHG | WEIGHT: 124.78 LBS | SYSTOLIC BLOOD PRESSURE: 116 MMHG | TEMPERATURE: 98.9 F | OXYGEN SATURATION: 100 % | BODY MASS INDEX: 22.82 KG/M2 | RESPIRATION RATE: 16 BRPM

## 2019-09-06 DIAGNOSIS — F41.9 ANXIETY DISORDER, UNSPECIFIED: ICD-10-CM

## 2019-09-06 DIAGNOSIS — N92.6 IRREGULAR MENSTRUATION, UNSPECIFIED: ICD-10-CM

## 2019-09-06 DIAGNOSIS — E55.9 VITAMIN D DEFICIENCY, UNSPECIFIED: ICD-10-CM

## 2019-09-06 DIAGNOSIS — E53.8 DEFICIENCY OF OTHER SPECIFIED B GROUP VITAMINS: ICD-10-CM

## 2019-09-06 LAB
BASOPHILS # BLD AUTO: 0 K/UL — SIGNIFICANT CHANGE UP (ref 0–0.2)
BASOPHILS NFR BLD AUTO: 1.4 % — SIGNIFICANT CHANGE UP (ref 0–2)
EOSINOPHIL # BLD AUTO: 0.1 K/UL — SIGNIFICANT CHANGE UP (ref 0–0.5)
EOSINOPHIL NFR BLD AUTO: 2.7 % — SIGNIFICANT CHANGE UP (ref 0–6)
HCT VFR BLD CALC: 40.4 % — SIGNIFICANT CHANGE UP (ref 34.5–45)
HGB BLD-MCNC: 13.2 G/DL — SIGNIFICANT CHANGE UP (ref 11.5–15.5)
LYMPHOCYTES # BLD AUTO: 1 K/UL — SIGNIFICANT CHANGE UP (ref 1–3.3)
LYMPHOCYTES # BLD AUTO: 28.3 % — SIGNIFICANT CHANGE UP (ref 13–44)
MCHC RBC-ENTMCNC: 28 PG — SIGNIFICANT CHANGE UP (ref 27–34)
MCHC RBC-ENTMCNC: 32.8 G/DL — SIGNIFICANT CHANGE UP (ref 32–36)
MCV RBC AUTO: 85.4 FL — SIGNIFICANT CHANGE UP (ref 80–100)
MONOCYTES # BLD AUTO: 0.3 K/UL — SIGNIFICANT CHANGE UP (ref 0–0.9)
MONOCYTES NFR BLD AUTO: 8.4 % — SIGNIFICANT CHANGE UP (ref 2–14)
NEUTROPHILS # BLD AUTO: 2.1 K/UL — SIGNIFICANT CHANGE UP (ref 1.8–7.4)
NEUTROPHILS NFR BLD AUTO: 59.3 % — SIGNIFICANT CHANGE UP (ref 43–77)
PLATELET # BLD AUTO: 184 K/UL — SIGNIFICANT CHANGE UP (ref 150–400)
RBC # BLD: 4.73 M/UL — SIGNIFICANT CHANGE UP (ref 3.8–5.2)
RBC # FLD: 12.5 % — SIGNIFICANT CHANGE UP (ref 10.3–14.5)
WBC # BLD: 3.6 K/UL — LOW (ref 3.8–10.5)
WBC # FLD AUTO: 3.6 K/UL — LOW (ref 3.8–10.5)

## 2019-09-06 PROCEDURE — 99215 OFFICE O/P EST HI 40 MIN: CPT

## 2019-09-06 RX ORDER — COLD-HOT PACK
50 EACH MISCELLANEOUS
Refills: 0 | Status: DISCONTINUED | COMMUNITY
Start: 2019-03-14 | End: 2019-09-06

## 2019-09-06 RX ORDER — TAMOXIFEN CITRATE 20 MG/1
20 TABLET, FILM COATED ORAL DAILY
Qty: 30 | Refills: 6 | Status: DISCONTINUED | COMMUNITY
Start: 2018-12-07 | End: 2019-09-06

## 2019-09-06 RX ORDER — ALPRAZOLAM 0.25 MG/1
0.25 TABLET ORAL
Qty: 30 | Refills: 0 | Status: ACTIVE | COMMUNITY
Start: 2019-09-06 | End: 1900-01-01

## 2019-09-07 PROBLEM — E53.8 LOW VITAMIN B12 LEVEL: Status: ACTIVE | Noted: 2019-09-06

## 2019-09-07 PROBLEM — E55.9 VITAMIN D INSUFFICIENCY: Status: ACTIVE | Noted: 2019-09-06

## 2019-09-07 PROBLEM — F41.9 ANXIETY: Status: ACTIVE | Noted: 2018-12-07

## 2019-09-09 ENCOUNTER — RESULT REVIEW (OUTPATIENT)
Age: 54
End: 2019-09-09

## 2019-09-09 LAB
25(OH)D3 SERPL-MCNC: 19.1 NG/ML
ALBUMIN SERPL ELPH-MCNC: 4.3 G/DL
ALP BLD-CCNC: 54 U/L
ALT SERPL-CCNC: 8 U/L
ANION GAP SERPL CALC-SCNC: 11 MMOL/L
AST SERPL-CCNC: 18 U/L
BILIRUB SERPL-MCNC: 0.2 MG/DL
BUN SERPL-MCNC: 6 MG/DL
CALCIUM SERPL-MCNC: 9.7 MG/DL
CHLORIDE SERPL-SCNC: 105 MMOL/L
CO2 SERPL-SCNC: 25 MMOL/L
CREAT SERPL-MCNC: 0.68 MG/DL
ESTRADIOL SERPL-MCNC: 936 PG/ML
FSH SERPL-MCNC: 8.5 IU/L
GLUCOSE SERPL-MCNC: 102 MG/DL
LH SERPL-ACNC: 4 IU/L
POTASSIUM SERPL-SCNC: 4.5 MMOL/L
PROT SERPL-MCNC: 6.4 G/DL
SODIUM SERPL-SCNC: 141 MMOL/L
VIT B12 SERPL-MCNC: 378 PG/ML

## 2020-03-03 ENCOUNTER — RX RENEWAL (OUTPATIENT)
Age: 55
End: 2020-03-03

## 2020-03-05 ENCOUNTER — OUTPATIENT (OUTPATIENT)
Dept: OUTPATIENT SERVICES | Facility: HOSPITAL | Age: 55
LOS: 1 days | Discharge: HOME | End: 2020-03-05

## 2020-03-05 DIAGNOSIS — Z98.890 OTHER SPECIFIED POSTPROCEDURAL STATES: Chronic | ICD-10-CM

## 2020-03-05 DIAGNOSIS — C50.919 MALIGNANT NEOPLASM OF UNSPECIFIED SITE OF UNSPECIFIED FEMALE BREAST: ICD-10-CM

## 2020-03-10 ENCOUNTER — APPOINTMENT (OUTPATIENT)
Dept: HEMATOLOGY ONCOLOGY | Facility: CLINIC | Age: 55
End: 2020-03-10

## 2020-03-30 ENCOUNTER — RX RENEWAL (OUTPATIENT)
Age: 55
End: 2020-03-30

## 2020-06-24 ENCOUNTER — RX RENEWAL (OUTPATIENT)
Age: 55
End: 2020-06-24

## 2020-09-22 ENCOUNTER — RESULT REVIEW (OUTPATIENT)
Age: 55
End: 2020-09-22

## 2020-10-21 ENCOUNTER — RX RENEWAL (OUTPATIENT)
Age: 55
End: 2020-10-21

## 2021-12-11 ENCOUNTER — APPOINTMENT (OUTPATIENT)
Dept: OBGYN | Facility: CLINIC | Age: 56
End: 2021-12-11

## 2022-01-05 ENCOUNTER — APPOINTMENT (OUTPATIENT)
Dept: OBGYN | Facility: CLINIC | Age: 57
End: 2022-01-05
Payer: COMMERCIAL

## 2022-01-05 VITALS
BODY MASS INDEX: 23 KG/M2 | WEIGHT: 125 LBS | SYSTOLIC BLOOD PRESSURE: 118 MMHG | DIASTOLIC BLOOD PRESSURE: 70 MMHG | HEIGHT: 62 IN

## 2022-01-05 DIAGNOSIS — Z01.419 ENCOUNTER FOR GYNECOLOGICAL EXAMINATION (GENERAL) (ROUTINE) W/OUT ABNORMAL FINDINGS: ICD-10-CM

## 2022-01-05 PROCEDURE — 99396 PREV VISIT EST AGE 40-64: CPT

## 2022-01-05 PROCEDURE — 82270 OCCULT BLOOD FECES: CPT

## 2022-01-05 RX ORDER — FLUCONAZOLE 150 MG/1
150 TABLET ORAL
Qty: 2 | Refills: 0 | Status: ACTIVE | COMMUNITY
Start: 2022-01-05 | End: 1900-01-01

## 2022-01-06 LAB — HPV HIGH+LOW RISK DNA PNL CVX: NOT DETECTED

## 2022-01-10 LAB
CANDIDA VAG CYTO: NOT DETECTED
CYTOLOGY CVX/VAG DOC THIN PREP: NORMAL
G VAGINALIS+PREV SP MTYP VAG QL MICRO: NOT DETECTED
T VAGINALIS VAG QL WET PREP: NOT DETECTED

## 2022-01-25 ENCOUNTER — NON-APPOINTMENT (OUTPATIENT)
Age: 57
End: 2022-01-25

## 2022-01-28 ENCOUNTER — ASOB RESULT (OUTPATIENT)
Age: 57
End: 2022-01-28

## 2022-01-28 ENCOUNTER — APPOINTMENT (OUTPATIENT)
Dept: OBGYN | Facility: CLINIC | Age: 57
End: 2022-01-28
Payer: COMMERCIAL

## 2022-01-28 PROCEDURE — 76830 TRANSVAGINAL US NON-OB: CPT

## 2022-02-03 ENCOUNTER — NON-APPOINTMENT (OUTPATIENT)
Age: 57
End: 2022-02-03

## 2022-02-06 DIAGNOSIS — B37.9 CANDIDIASIS, UNSPECIFIED: ICD-10-CM

## 2022-02-06 RX ORDER — CLOTRIMAZOLE AND BETAMETHASONE DIPROPIONATE 10; .5 MG/G; MG/G
1-0.05 CREAM TOPICAL TWICE DAILY
Qty: 1 | Refills: 0 | Status: ACTIVE | COMMUNITY
Start: 2022-02-06 | End: 1900-01-01

## 2022-02-06 RX ORDER — FLUCONAZOLE 150 MG/1
150 TABLET ORAL
Qty: 2 | Refills: 0 | Status: ACTIVE | COMMUNITY
Start: 2022-02-06 | End: 1900-01-01

## 2022-02-24 ENCOUNTER — APPOINTMENT (OUTPATIENT)
Dept: OBGYN | Facility: CLINIC | Age: 57
End: 2022-02-24

## 2022-03-04 ENCOUNTER — ASOB RESULT (OUTPATIENT)
Age: 57
End: 2022-03-04

## 2022-03-04 ENCOUNTER — APPOINTMENT (OUTPATIENT)
Dept: OBGYN | Facility: CLINIC | Age: 57
End: 2022-03-04
Payer: COMMERCIAL

## 2022-03-04 PROCEDURE — 76831 ECHO EXAM UTERUS: CPT

## 2022-03-04 PROCEDURE — 58340 CATHETER FOR HYSTEROGRAPHY: CPT

## 2022-03-04 NOTE — PROCEDURE
[Abnormal Uterine Bleeding] : abnormal uterine bleeding [Saline Infusion Sonography] : saline infusion sonography [Retroverted] : retroverted [FreeTextEntry9] : thickened EM, on tamoxifen [FreeTextEntry3] : 03/04/2022. JOLLY BIRMINGHAM 56 year year old female presents for a sonohysterogram due to 10mm lining on tamoxifen, r/o EM polyp \par Discussed risk of bleeding or infection\par Under sterile technique\par A speculum was placed in the vagina and the Cervix was identified and prepped with Betadine\par SHG catheter was threaded through the external os until endometrial location was felt\par The balloon was then inflated with 0.5 cc of saline\par The transvaginal probe was then inserted into the vagina by the tech\par The catheter and balloon was localized\par Saline was gently instilled while the imaging was performed\par The endometrial cavity was fully visualized\par \par Findings: fundal anterior polyp measuring 1.47 cm\par \par Plan: Operative Hysteroscopy Removal of Polyp and D&C, Mirena IUD placement\par Pt tolerated procedure well and was given instructions prior to discharge\par Plan to fax notes to pt's gyn oncologist Dr. Marley per pt's request ([facility])\par Will discussed Mirena IUD placement with pt's Oncologist Dr. Stewart ([facility])\par D/w/ Dr. Gómez\par

## 2022-03-07 ENCOUNTER — NON-APPOINTMENT (OUTPATIENT)
Age: 57
End: 2022-03-07

## 2022-03-29 ENCOUNTER — NON-APPOINTMENT (OUTPATIENT)
Age: 57
End: 2022-03-29

## 2022-03-31 ENCOUNTER — APPOINTMENT (OUTPATIENT)
Dept: OBGYN | Facility: CLINIC | Age: 57
End: 2022-03-31

## 2022-04-13 ENCOUNTER — APPOINTMENT (OUTPATIENT)
Dept: OBGYN | Facility: CLINIC | Age: 57
End: 2022-04-13

## 2022-07-29 NOTE — ASU PREOP CHECKLIST - NS PREOP CHK MONITOR ANESTHESIA CONSENT
Quality 226: Preventive Care And Screening: Tobacco Use: Screening And Cessation Intervention: Patient screened for tobacco use and is an ex/non-smoker
Quality 111:Pneumonia Vaccination Status For Older Adults: Pneumococcal vaccine was not administered on or after patientâs 60th birthday and before the end of the measurement period, reason not otherwise specified
Quality 130: Documentation Of Current Medications In The Medical Record: Current Medications Documented
Quality 47: Advance Care Plan: Advance care planning not documented, reason not otherwise specified.
Quality 431: Preventive Care And Screening: Unhealthy Alcohol Use - Screening: Patient not identified as an unhealthy alcohol user when screened for unhealthy alcohol use using a systematic screening method
Quality 110: Preventive Care And Screening: Influenza Immunization: Influenza Immunization Administered during Influenza season
Detail Level: Detailed
done

## 2022-11-08 ENCOUNTER — NON-APPOINTMENT (OUTPATIENT)
Age: 57
End: 2022-11-08

## 2022-11-08 ENCOUNTER — APPOINTMENT (OUTPATIENT)
Dept: OBGYN | Facility: CLINIC | Age: 57
End: 2022-11-08

## 2022-11-08 DIAGNOSIS — R35.0 FREQUENCY OF MICTURITION: ICD-10-CM

## 2022-11-08 PROCEDURE — 81002 URINALYSIS NONAUTO W/O SCOPE: CPT

## 2022-11-08 PROCEDURE — 99213 OFFICE O/P EST LOW 20 MIN: CPT | Mod: 25

## 2022-11-08 RX ORDER — FLUCONAZOLE 150 MG/1
150 TABLET ORAL
Qty: 3 | Refills: 0 | Status: ACTIVE | COMMUNITY
Start: 2022-11-08 | End: 1900-01-01

## 2022-11-10 LAB
BACTERIA UR CULT: NORMAL
CANDIDA VAG CYTO: NOT DETECTED
G VAGINALIS+PREV SP MTYP VAG QL MICRO: NOT DETECTED
T VAGINALIS VAG QL WET PREP: NOT DETECTED

## 2023-04-11 ENCOUNTER — APPOINTMENT (OUTPATIENT)
Dept: OBGYN | Facility: CLINIC | Age: 58
End: 2023-04-11
Payer: COMMERCIAL

## 2023-04-11 VITALS
WEIGHT: 129 LBS | SYSTOLIC BLOOD PRESSURE: 109 MMHG | HEIGHT: 62 IN | DIASTOLIC BLOOD PRESSURE: 75 MMHG | BODY MASS INDEX: 23.74 KG/M2

## 2023-04-11 DIAGNOSIS — Z92.29 PERSONAL HISTORY OF OTHER DRUG THERAPY: ICD-10-CM

## 2023-04-11 PROCEDURE — 82270 OCCULT BLOOD FECES: CPT

## 2023-04-11 PROCEDURE — 99396 PREV VISIT EST AGE 40-64: CPT

## 2023-04-11 RX ORDER — CLOTRIMAZOLE AND BETAMETHASONE DIPROPIONATE 10; .5 MG/G; MG/G
1-0.05 CREAM TOPICAL
Qty: 1 | Refills: 0 | Status: ACTIVE | COMMUNITY
Start: 2022-11-08 | End: 1900-01-01

## 2023-04-11 NOTE — HISTORY OF PRESENT ILLNESS
[Patient reported bone density results were normal] : Patient reported bone density results were normal [Patient reported colonoscopy was normal] : Patient reported colonoscopy was normal [Patient reported mammogram was normal] : Patient reported mammogram was normal [Patient reported breast sonogram was normal] : Patient reported breast sonogram was normal [FreeTextEntry1] : 2023. JOLLY BIRMINGHAM 57 year old female  LMP 2023. PMH hx of breast cancer, lumpectomy, hx of abnl pap, recurrent polyps, hysteroscopy and D&C x3. She presents for an annual gyn exam \par \par She feels well and offers no complaints. She reports infrequent menses. Prior to her menses on  she did not have a menses for 6 months. She has been having infrequent  menses since starting Tamoxifen. She denies intermenstrual bleeding, abnormal vaginal discharge or vaginitis sxs. No urinary complaints. BM is normal per patient, no bloody stool. She denies abdominal and pelvic pain.\par \par Pt sees Breast surgeon Dr. Herrera and oncologist: Dr. Garcia at [facility]. Pt had a hysteroscopy and D&C for a polyp removal on 3/22 with Dr. Marley in [facility]. Otherwise, no new medical conditions, medications or surgeries.\par \par ObHx:FT  x 2, TOP x 1\par GynHx: Recurrent polyps, Hx breast cancer 2019, Hs of abnl pap on her 20's and 30's No Hx of STI, FIbroids, Ovarian cysts, or pelvic infections.\par PMH: asthma, anxiety\par SHx: lumpectomy 2018, hysteroscopy and D&C x3 for uterine polyps, last 3/22 w/ \par Allergies: Sudafed, shellfish \par Meds: tamoxifen, allegra, vits\par FHx: Brest cancer: self, colon cancer: MGM. Denies FHx of ovarian, uterine cancer.\par \par  [Mammogramdate] : 6/22 [BreastSonogramDate] : 6/22 [BoneDensityDate] : 2018 [ColonoscopyDate] : 2022

## 2023-04-11 NOTE — PLAN
[FreeTextEntry1] : 57 year old female presents for routine gyn exam - hx of breast cancer, lumpectomy, hx of abnl pap,  recurrent polyps, hysteroscopy and D&C x3, on Tamoxifen \par BSE taught\par Breast and pelvic exam performed\par Pap/HPV conducted\par 2022 colonoscopy, due in 2027\par 2018 DEXA bone density. Rx given, due now\par \par Hx of breast cancer\par 6/22 mammogram and breast sonogram, due in 6/23\par continue to f/u with Breast surgeon Dr. Herrera and oncologist: Dr. Garcia\par \par Recurrent polyp, on Tamoxifen\par Hysteroscopy and D&C on 3/22 w/ Dr. Marley in [facility]\par Advised to schedule Pelvic sono 4-23\par to d/w onc when labs suggest menopause to consider change to anastrazole due to recurrent polyps on tamoxifen\par \par RTO in 1 year or PRN

## 2023-04-13 LAB — HPV HIGH+LOW RISK DNA PNL CVX: NOT DETECTED

## 2023-04-17 LAB — CYTOLOGY CVX/VAG DOC THIN PREP: NORMAL

## 2023-04-25 ENCOUNTER — ASOB RESULT (OUTPATIENT)
Age: 58
End: 2023-04-25

## 2023-04-25 ENCOUNTER — APPOINTMENT (OUTPATIENT)
Dept: OBGYN | Facility: CLINIC | Age: 58
End: 2023-04-25
Payer: COMMERCIAL

## 2023-04-25 PROCEDURE — 77080 DXA BONE DENSITY AXIAL: CPT

## 2023-04-25 PROCEDURE — 76830 TRANSVAGINAL US NON-OB: CPT

## 2023-04-26 ENCOUNTER — NON-APPOINTMENT (OUTPATIENT)
Age: 58
End: 2023-04-26

## 2023-04-28 ENCOUNTER — APPOINTMENT (OUTPATIENT)
Dept: OBGYN | Facility: CLINIC | Age: 58
End: 2023-04-28
Payer: COMMERCIAL

## 2023-04-28 ENCOUNTER — ASOB RESULT (OUTPATIENT)
Age: 58
End: 2023-04-28

## 2023-04-28 DIAGNOSIS — N84.0 POLYP OF CORPUS UTERI: ICD-10-CM

## 2023-04-28 LAB — HCG UR QL: NEGATIVE

## 2023-04-28 PROCEDURE — 81025 URINE PREGNANCY TEST: CPT

## 2023-04-28 PROCEDURE — 76831 ECHO EXAM UTERUS: CPT

## 2023-04-28 PROCEDURE — 58340 CATHETER FOR HYSTEROGRAPHY: CPT

## 2023-05-04 ENCOUNTER — NON-APPOINTMENT (OUTPATIENT)
Age: 58
End: 2023-05-04

## 2023-05-16 ENCOUNTER — APPOINTMENT (OUTPATIENT)
Dept: OBGYN | Facility: CLINIC | Age: 58
End: 2023-05-16
Payer: COMMERCIAL

## 2023-05-16 PROCEDURE — 99442: CPT

## 2023-05-25 ENCOUNTER — NON-APPOINTMENT (OUTPATIENT)
Age: 58
End: 2023-05-25

## 2023-05-26 ENCOUNTER — NON-APPOINTMENT (OUTPATIENT)
Age: 58
End: 2023-05-26

## 2023-05-29 RX ORDER — FLUCONAZOLE 150 MG/1
150 TABLET ORAL
Qty: 1 | Refills: 0 | Status: ACTIVE | COMMUNITY
Start: 2023-05-26 | End: 1900-01-01

## 2023-05-30 ENCOUNTER — APPOINTMENT (OUTPATIENT)
Dept: OBGYN | Facility: CLINIC | Age: 58
End: 2023-05-30

## 2023-05-31 ENCOUNTER — OUTPATIENT (OUTPATIENT)
Dept: OUTPATIENT SERVICES | Facility: HOSPITAL | Age: 58
LOS: 1 days | End: 2023-05-31
Payer: COMMERCIAL

## 2023-05-31 VITALS
SYSTOLIC BLOOD PRESSURE: 120 MMHG | RESPIRATION RATE: 20 BRPM | HEIGHT: 63 IN | DIASTOLIC BLOOD PRESSURE: 79 MMHG | HEART RATE: 82 BPM | OXYGEN SATURATION: 98 % | WEIGHT: 132.06 LBS | TEMPERATURE: 98 F

## 2023-05-31 DIAGNOSIS — R93.89 ABNORMAL FINDINGS ON DIAGNOSTIC IMAGING OF OTHER SPECIFIED BODY STRUCTURES: ICD-10-CM

## 2023-05-31 DIAGNOSIS — N84.0 POLYP OF CORPUS UTERI: ICD-10-CM

## 2023-05-31 DIAGNOSIS — Z01.818 ENCOUNTER FOR OTHER PREPROCEDURAL EXAMINATION: ICD-10-CM

## 2023-05-31 DIAGNOSIS — C50.919 MALIGNANT NEOPLASM OF UNSPECIFIED SITE OF UNSPECIFIED FEMALE BREAST: ICD-10-CM

## 2023-05-31 DIAGNOSIS — Z98.890 OTHER SPECIFIED POSTPROCEDURAL STATES: Chronic | ICD-10-CM

## 2023-05-31 LAB
ANION GAP SERPL CALC-SCNC: 15 MMOL/L — SIGNIFICANT CHANGE UP (ref 5–17)
BLD GP AB SCN SERPL QL: NEGATIVE — SIGNIFICANT CHANGE UP
BUN SERPL-MCNC: 7 MG/DL — SIGNIFICANT CHANGE UP (ref 7–23)
CALCIUM SERPL-MCNC: 9.4 MG/DL — SIGNIFICANT CHANGE UP (ref 8.4–10.5)
CHLORIDE SERPL-SCNC: 103 MMOL/L — SIGNIFICANT CHANGE UP (ref 96–108)
CO2 SERPL-SCNC: 24 MMOL/L — SIGNIFICANT CHANGE UP (ref 22–31)
CREAT SERPL-MCNC: 0.82 MG/DL — SIGNIFICANT CHANGE UP (ref 0.5–1.3)
EGFR: 84 ML/MIN/1.73M2 — SIGNIFICANT CHANGE UP
GLUCOSE SERPL-MCNC: 88 MG/DL — SIGNIFICANT CHANGE UP (ref 70–99)
HCT VFR BLD CALC: 43.6 % — SIGNIFICANT CHANGE UP (ref 34.5–45)
HGB BLD-MCNC: 14 G/DL — SIGNIFICANT CHANGE UP (ref 11.5–15.5)
MCHC RBC-ENTMCNC: 26.9 PG — LOW (ref 27–34)
MCHC RBC-ENTMCNC: 32.1 GM/DL — SIGNIFICANT CHANGE UP (ref 32–36)
MCV RBC AUTO: 83.7 FL — SIGNIFICANT CHANGE UP (ref 80–100)
NRBC # BLD: 0 /100 WBCS — SIGNIFICANT CHANGE UP (ref 0–0)
PLATELET # BLD AUTO: 192 K/UL — SIGNIFICANT CHANGE UP (ref 150–400)
POTASSIUM SERPL-MCNC: 3.9 MMOL/L — SIGNIFICANT CHANGE UP (ref 3.5–5.3)
POTASSIUM SERPL-SCNC: 3.9 MMOL/L — SIGNIFICANT CHANGE UP (ref 3.5–5.3)
RBC # BLD: 5.21 M/UL — HIGH (ref 3.8–5.2)
RBC # FLD: 12.1 % — SIGNIFICANT CHANGE UP (ref 10.3–14.5)
RH IG SCN BLD-IMP: POSITIVE — SIGNIFICANT CHANGE UP
SODIUM SERPL-SCNC: 142 MMOL/L — SIGNIFICANT CHANGE UP (ref 135–145)
WBC # BLD: 5.23 K/UL — SIGNIFICANT CHANGE UP (ref 3.8–10.5)
WBC # FLD AUTO: 5.23 K/UL — SIGNIFICANT CHANGE UP (ref 3.8–10.5)

## 2023-05-31 PROCEDURE — 86850 RBC ANTIBODY SCREEN: CPT

## 2023-05-31 PROCEDURE — 86901 BLOOD TYPING SEROLOGIC RH(D): CPT

## 2023-05-31 PROCEDURE — 86900 BLOOD TYPING SEROLOGIC ABO: CPT

## 2023-05-31 PROCEDURE — 80048 BASIC METABOLIC PNL TOTAL CA: CPT

## 2023-05-31 PROCEDURE — 85027 COMPLETE CBC AUTOMATED: CPT

## 2023-05-31 PROCEDURE — G0463: CPT

## 2023-05-31 RX ORDER — LORATADINE 10 MG/1
1 TABLET ORAL
Qty: 0 | Refills: 0 | DISCHARGE

## 2023-05-31 RX ORDER — LIDOCAINE HCL 20 MG/ML
0.2 VIAL (ML) INJECTION ONCE
Refills: 0 | Status: DISCONTINUED | OUTPATIENT
Start: 2023-06-14 | End: 2023-06-28

## 2023-05-31 RX ORDER — SODIUM CHLORIDE 9 MG/ML
1000 INJECTION, SOLUTION INTRAVENOUS
Refills: 0 | Status: DISCONTINUED | OUTPATIENT
Start: 2023-06-14 | End: 2023-06-28

## 2023-05-31 NOTE — H&P PST ADULT - HISTORY OF PRESENT ILLNESS
57 yr old female with history of Breast Ca ( left) - s/p lumpectomy - on Tamoxifen, history of endometrial thickening in past -with removal of polyp's , with recent endometrial thickening in ultrasound , Coming in for Exam under anesthesia , Possible D & C, Operative hysteroscopy , Removal of endometrial polyp on 6/14/2023.     Denies Recent travel, Exposure or Covid symptoms

## 2023-05-31 NOTE — H&P PST ADULT - PROBLEM SELECTOR PLAN 1
Exam under anesthesia , Possible D & C, Operative hysteroscopy , Removal of endometrial polyp on 6/14/2023.  UCG - day of surgery

## 2023-05-31 NOTE — H&P PST ADULT - ASSESSMENT
Airway:  normal    Mallampati-       Dental: Patient denies loose teeth    Activity :  dasi mets :    Airway:  normal    Mallampati-   2    Dental: Patient denies loose teeth    Activity : cardio 4 x week   dasi mets : 8 dasi mets

## 2023-05-31 NOTE — H&P PST ADULT - NSICDXPASTSURGICALHX_GEN_ALL_CORE_FT
PAST SURGICAL HISTORY:  S/P lumpectomy, left breast with 3 lymph nodes removed     PAST SURGICAL HISTORY:  S/P lumpectomy, left breast with 3 lymph nodes removed    Status post hysteroscopic polypectomy

## 2023-06-02 ENCOUNTER — APPOINTMENT (OUTPATIENT)
Dept: OBGYN | Facility: CLINIC | Age: 58
End: 2023-06-02
Payer: COMMERCIAL

## 2023-06-02 DIAGNOSIS — R30.0 DYSURIA: ICD-10-CM

## 2023-06-02 PROCEDURE — 99214 OFFICE O/P EST MOD 30 MIN: CPT

## 2023-06-02 PROCEDURE — 81002 URINALYSIS NONAUTO W/O SCOPE: CPT

## 2023-06-04 LAB
APPEARANCE: CLEAR
BACTERIA UR CULT: NORMAL
BILIRUBIN URINE: NEGATIVE
BLOOD URINE: NEGATIVE
COLOR: YELLOW
GLUCOSE QUALITATIVE U: NEGATIVE MG/DL
KETONES URINE: NEGATIVE MG/DL
LEUKOCYTE ESTERASE URINE: NEGATIVE
NITRITE URINE: NEGATIVE
PH URINE: 7
PROTEIN URINE: NEGATIVE MG/DL
SPECIFIC GRAVITY URINE: 1.01
UROBILINOGEN URINE: 0.2 MG/DL

## 2023-06-13 ENCOUNTER — TRANSCRIPTION ENCOUNTER (OUTPATIENT)
Age: 58
End: 2023-06-13

## 2023-06-14 ENCOUNTER — APPOINTMENT (OUTPATIENT)
Dept: OBGYN | Facility: CLINIC | Age: 58
End: 2023-06-14
Payer: COMMERCIAL

## 2023-06-14 ENCOUNTER — TRANSCRIPTION ENCOUNTER (OUTPATIENT)
Age: 58
End: 2023-06-14

## 2023-06-14 ENCOUNTER — APPOINTMENT (OUTPATIENT)
Dept: OBGYN | Facility: HOSPITAL | Age: 58
End: 2023-06-14

## 2023-06-14 ENCOUNTER — OUTPATIENT (OUTPATIENT)
Dept: OUTPATIENT SERVICES | Facility: HOSPITAL | Age: 58
LOS: 1 days | End: 2023-06-14
Payer: COMMERCIAL

## 2023-06-14 ENCOUNTER — RESULT REVIEW (OUTPATIENT)
Age: 58
End: 2023-06-14

## 2023-06-14 VITALS
OXYGEN SATURATION: 100 % | WEIGHT: 132.06 LBS | RESPIRATION RATE: 16 BRPM | SYSTOLIC BLOOD PRESSURE: 132 MMHG | DIASTOLIC BLOOD PRESSURE: 85 MMHG | HEART RATE: 83 BPM | HEIGHT: 63 IN | TEMPERATURE: 97 F

## 2023-06-14 VITALS
HEART RATE: 65 BPM | OXYGEN SATURATION: 99 % | SYSTOLIC BLOOD PRESSURE: 108 MMHG | TEMPERATURE: 97 F | DIASTOLIC BLOOD PRESSURE: 64 MMHG | RESPIRATION RATE: 15 BRPM

## 2023-06-14 DIAGNOSIS — Z98.890 OTHER SPECIFIED POSTPROCEDURAL STATES: Chronic | ICD-10-CM

## 2023-06-14 DIAGNOSIS — N84.0 POLYP OF CORPUS UTERI: ICD-10-CM

## 2023-06-14 PROCEDURE — 58558 HYSTEROSCOPY BIOPSY: CPT

## 2023-06-14 PROCEDURE — 88305 TISSUE EXAM BY PATHOLOGIST: CPT | Mod: 26

## 2023-06-14 PROCEDURE — ZZZZZ: CPT

## 2023-06-14 PROCEDURE — 88305 TISSUE EXAM BY PATHOLOGIST: CPT

## 2023-06-14 RX ORDER — ONDANSETRON 8 MG/1
4 TABLET, FILM COATED ORAL ONCE
Refills: 0 | Status: DISCONTINUED | OUTPATIENT
Start: 2023-06-14 | End: 2023-06-28

## 2023-06-14 RX ORDER — FEXOFENADINE HCL 30 MG
1 TABLET ORAL
Refills: 0 | DISCHARGE

## 2023-06-14 RX ORDER — OXYCODONE HYDROCHLORIDE 5 MG/1
5 TABLET ORAL ONCE
Refills: 0 | Status: DISCONTINUED | OUTPATIENT
Start: 2023-06-14 | End: 2023-06-14

## 2023-06-14 RX ORDER — TAMOXIFEN CITRATE 20 MG/1
1 TABLET, FILM COATED ORAL
Qty: 0 | Refills: 0 | DISCHARGE

## 2023-06-14 RX ORDER — APREPITANT 80 MG/1
40 CAPSULE ORAL ONCE
Refills: 0 | Status: COMPLETED | OUTPATIENT
Start: 2023-06-14 | End: 2023-06-14

## 2023-06-14 RX ORDER — CHOLECALCIFEROL (VITAMIN D3) 125 MCG
1 CAPSULE ORAL
Qty: 0 | Refills: 0 | DISCHARGE

## 2023-06-14 RX ORDER — PREGABALIN 225 MG/1
1 CAPSULE ORAL
Qty: 0 | Refills: 0 | DISCHARGE

## 2023-06-14 RX ORDER — HYDROMORPHONE HYDROCHLORIDE 2 MG/ML
0.25 INJECTION INTRAMUSCULAR; INTRAVENOUS; SUBCUTANEOUS
Refills: 0 | Status: DISCONTINUED | OUTPATIENT
Start: 2023-06-14 | End: 2023-06-14

## 2023-06-14 RX ADMIN — APREPITANT 40 MILLIGRAM(S): 80 CAPSULE ORAL at 06:53

## 2023-06-14 NOTE — BRIEF OPERATIVE NOTE - NSICDXBRIEFPROCEDURE_GEN_ALL_CORE_FT
PROCEDURES:  Dilation and curettage 14-Jun-2023 07:27:33  Diana Ahn  Exam, under anesthesia 14-Jun-2023 07:27:41  Diana Ahn  Hysteroscopic surgical procedure 14-Jun-2023 07:27:44  Diana Ahn

## 2023-06-14 NOTE — ASU PATIENT PROFILE, ADULT - FALL HARM RISK - UNIVERSAL INTERVENTIONS
Bed in lowest position, wheels locked, appropriate side rails in place/Call bell, personal items and telephone in reach/Instruct patient to call for assistance before getting out of bed or chair/Non-slip footwear when patient is out of bed/[place] to call system/Physically safe environment - no spills, clutter or unnecessary equipment/Purposeful Proactive Rounding/Room/bathroom lighting operational, light cord in reach

## 2023-06-14 NOTE — ASU PATIENT PROFILE, ADULT - NSICDXPASTSURGICALHX_GEN_ALL_CORE_FT
Detail Level: Detailed Number Of Freeze-Thaw Cycles: 2 freeze-thaw cycles Duration Of Freeze Thaw-Cycle (Seconds): 5 Post-Care Instructions: Apply Vaseline frequently. WCI given Render Post-Care Instructions In Note?: no Consent: Verbal consent was obtained and risks were reviewed including, but not limited to, scarring, infection, bleeding, scabbing, and incomplete removal. Discussed wound care instruction PAST SURGICAL HISTORY:  S/P lumpectomy, left breast with 3 lymph nodes removed    Status post hysteroscopic polypectomy

## 2023-06-14 NOTE — ASU DISCHARGE PLAN (ADULT/PEDIATRIC) - NS MD DC FALL RISK RISK
For information on Fall & Injury Prevention, visit: [email or web address] OR  [email or web address] OR  https://www.cdc.gov/steadi/patient.html

## 2023-06-14 NOTE — ASU DISCHARGE PLAN (ADULT/PEDIATRIC) - ASU DC SPECIAL INSTRUCTIONSFT
MD RAYMUNDO ARECHIGA MD MARINESE DORENE, MD CHUNG HETTY, MD HUNTER TIFFANY, MD AHAMED TARNIMA, MD  PHONE: [phone]  FAX: [phone]    POSTOPERATIVE INSTRUCTIONS FOR HYSTEROSCOPY, ENDOMETRIAL POLYP/FIBROID REMOVAL,D&C    After your surgery it is normal to experience:    •	Vaginal bleeding- can last 1-2 weeks and should not be heavier than a period. It may come and go and be red, brown or pink. Use pads, not tampons.  •	Cramping- Is common especially within the first 24 hours. This should be relieved by taking over the counter motrin, advil or Tylenol.  •	Watery discharge- can be seen for a day or two after hysteroscopy because some of the fluid that is put into the uterus during surgery may continue to leak out for a day or two.  •	Vaginal soreness/irritation- can occur in the first few days after surgery because of the instruments that were used in the vagina. Soreness can be treated with ice pack and irritation can be taken care of with an emollient such as balmex or aquaphor that you can put directly on the irritated area.    Restrictions: For 10-14 days after the surgery you should avoid the following:    •	Tampons  •	Sex  •	Vigorous gym exercise  •	Swimming  pools and tub baths  •	Wait a day or two before going back to work    Anesthesia Precautions:  For the next 12 hours do not:   •	drive a car,  •	 operate power tools or machinery,  •	drink alcohol, beer, or wine,   •	make important personal or business decisions    Diet:   •	Resume Regular diet but Progress diet slowly     Physician Notification- Warning signs to look out for  •	Heavy Vaginal Bleeding   •	Shortness of breath or chest pain  •	Severe Abdominal Pain  •	Persistent nausea and vomiting  •	Pain not relieved by medications  •	Fever greater than 100.5®F  •	Inability to tolerate liquids or foods  •	Unable to urinate after 8 hours    Discharge and Disposition  •	Discharge to home    Follow Up Care:  •	In the event that you develop a complication and you are unable to reach your own physician, you may contact:  [phone] or go to the nearest Emergency Room.   •	Please contact the office to make a follow up appointment [phone]

## 2023-06-14 NOTE — ASU DISCHARGE PLAN (ADULT/PEDIATRIC) - NURSING INSTRUCTIONS
Next dose of Tylenol will be on or after __1pm_________ ,today/tonight and every 6 hours afterwards for pain management, do not take any Tylenol containing products until this time. Your first dose of Tylenol was given at ___7am________. Do not exceed more than 4000mg of Tylenol in one 24 hour setting. If no contraindications, you may alternate with Ibuprofen 3 hours after dose of Tylenol. Ibuprofen can be taken every 6 hours. Next dose of ibuprofen can be taken at/after--- 1:30pm--. First dose was given in the OR at 7:30am.

## 2023-06-20 LAB — SURGICAL PATHOLOGY STUDY: SIGNIFICANT CHANGE UP

## 2023-06-27 ENCOUNTER — APPOINTMENT (OUTPATIENT)
Dept: OBGYN | Facility: CLINIC | Age: 58
End: 2023-06-27
Payer: COMMERCIAL

## 2023-06-27 PROCEDURE — 99213 OFFICE O/P EST LOW 20 MIN: CPT

## 2023-06-27 NOTE — PLAN
[FreeTextEntry1] : 57 year old female presents for post-op visit hysteroscopy D&C polypectomy \par Pathology reviewed w/ pt, benign\par Pelvic exam performed - can go back to normal routine \par D/w pt about recurrence of polyps \par Call MD if heavy bleeding, fever, or chills \par \par RTO PRN

## 2023-06-27 NOTE — HISTORY OF PRESENT ILLNESS
[FreeTextEntry1] : 2023. JOLLY BIRMINGHAM 57 year old female . PMH hx of breast cancer, lumpectomy, hx of abnl pap, recurrent polyps, hysteroscopy and D&C x3. She presents for a post op exam, s/p  hysteroscopy D&C polypectomy on 23.\par \par She feels well and offers no complaints. no vb . Additionally, she had a swallowing problem after procedure that resolved on its own. Her BM have been irregular but attributes it to anesthesia, no bloody stool. She denies vaginal bleeding, abnormal vaginal discharge or vaginitis sxs. No urinary complaints. She denies abdominal and pelvic pain.\par \par Pathology-benign

## 2023-07-12 ENCOUNTER — APPOINTMENT (OUTPATIENT)
Dept: OBGYN | Facility: CLINIC | Age: 58
End: 2023-07-12

## 2023-07-12 ENCOUNTER — APPOINTMENT (OUTPATIENT)
Dept: OBGYN | Facility: HOSPITAL | Age: 58
End: 2023-07-12

## 2023-07-19 ENCOUNTER — APPOINTMENT (OUTPATIENT)
Dept: OBGYN | Facility: CLINIC | Age: 58
End: 2023-07-19
Payer: COMMERCIAL

## 2023-07-19 ENCOUNTER — NON-APPOINTMENT (OUTPATIENT)
Age: 58
End: 2023-07-19

## 2023-07-19 VITALS — SYSTOLIC BLOOD PRESSURE: 113 MMHG | DIASTOLIC BLOOD PRESSURE: 70 MMHG

## 2023-07-19 DIAGNOSIS — N76.2 ACUTE VULVITIS: ICD-10-CM

## 2023-07-19 PROCEDURE — 99214 OFFICE O/P EST MOD 30 MIN: CPT

## 2023-07-19 RX ORDER — NYSTATIN AND TRIAMCINOLONE ACETONIDE 100000; 1 [USP'U]/G; MG/G
100000-0.1 OINTMENT TOPICAL
Qty: 1 | Refills: 0 | Status: ACTIVE | COMMUNITY
Start: 2023-07-19 | End: 1900-01-01

## 2023-07-19 RX ORDER — NYSTATIN AND TRIAMCINOLONE ACETONIDE 100000; 1 [USP'U]/G; MG/G
100000-0.1 OINTMENT TOPICAL
Qty: 1 | Refills: 1 | Status: ACTIVE | COMMUNITY
Start: 2023-07-19 | End: 1900-01-01

## 2023-07-24 LAB
A VAGINAE DNA VAG QL NAA+PROBE: NORMAL
BVAB2 DNA VAG QL NAA+PROBE: NORMAL
C KRUSEI DNA VAG QL NAA+PROBE: NEGATIVE
CANDIDA DNA VAG QL NAA+PROBE: NEGATIVE
MEGA1 DNA VAG QL NAA+PROBE: NORMAL
T VAGINALIS RRNA SPEC QL NAA+PROBE: NEGATIVE

## 2024-04-01 ENCOUNTER — OUTPATIENT (OUTPATIENT)
Dept: OUTPATIENT SERVICES | Facility: HOSPITAL | Age: 59
LOS: 1 days | Discharge: HOME | End: 2024-04-01

## 2024-04-01 DIAGNOSIS — Z98.890 OTHER SPECIFIED POSTPROCEDURAL STATES: Chronic | ICD-10-CM

## 2024-04-01 DIAGNOSIS — C50.919 MALIGNANT NEOPLASM OF UNSPECIFIED SITE OF UNSPECIFIED FEMALE BREAST: ICD-10-CM

## 2024-04-10 ENCOUNTER — APPOINTMENT (OUTPATIENT)
Dept: HEMATOLOGY ONCOLOGY | Facility: CLINIC | Age: 59
End: 2024-04-10
Payer: COMMERCIAL

## 2024-04-10 VITALS
OXYGEN SATURATION: 98 % | HEART RATE: 78 BPM | WEIGHT: 128 LBS | RESPIRATION RATE: 16 BRPM | BODY MASS INDEX: 23.41 KG/M2 | TEMPERATURE: 97.5 F | DIASTOLIC BLOOD PRESSURE: 70 MMHG | SYSTOLIC BLOOD PRESSURE: 106 MMHG

## 2024-04-10 DIAGNOSIS — Z80.8 FAMILY HISTORY OF MALIGNANT NEOPLASM OF OTHER ORGANS OR SYSTEMS: ICD-10-CM

## 2024-04-10 DIAGNOSIS — Z09 ENCOUNTER FOR FOLLOW-UP EXAMINATION AFTER COMPLETED TREATMENT FOR CONDITIONS OTHER THAN MALIGNANT NEOPLASM: ICD-10-CM

## 2024-04-10 DIAGNOSIS — Z78.9 OTHER SPECIFIED HEALTH STATUS: ICD-10-CM

## 2024-04-10 DIAGNOSIS — Z85.3 PERSONAL HISTORY OF MALIGNANT NEOPLASM OF BREAST: ICD-10-CM

## 2024-04-10 DIAGNOSIS — Z80.1 FAMILY HISTORY OF MALIGNANT NEOPLASM OF TRACHEA, BRONCHUS AND LUNG: ICD-10-CM

## 2024-04-10 DIAGNOSIS — Z17.1 PERSONAL HISTORY OF MALIGNANT NEOPLASM OF BREAST: ICD-10-CM

## 2024-04-10 DIAGNOSIS — Z80.0 FAMILY HISTORY OF MALIGNANT NEOPLASM OF DIGESTIVE ORGANS: ICD-10-CM

## 2024-04-10 DIAGNOSIS — N84.0 POLYP OF CORPUS UTERI: ICD-10-CM

## 2024-04-10 PROCEDURE — 99205 OFFICE O/P NEW HI 60 MIN: CPT

## 2024-04-10 RX ORDER — TAMOXIFEN CITRATE 10 MG/1
10 TABLET, FILM COATED ORAL TWICE DAILY
Qty: 60 | Refills: 1 | Status: DISCONTINUED | COMMUNITY
Start: 2019-03-14 | End: 2024-04-10

## 2024-04-10 NOTE — OB HISTORY
[___] :  Spontaneous: [unfilled] [Menarche Age: ____] : age at menarche was [unfilled] [Menopause Age: ____] : age at menopause was [unfilled]

## 2024-04-11 PROBLEM — N84.0 ENDOMETRIAL POLYP: Status: ACTIVE | Noted: 2023-05-16

## 2024-04-11 PROBLEM — Z09 POSTOP CHECK: Status: RESOLVED | Noted: 2023-06-27 | Resolved: 2024-04-11

## 2024-04-11 PROBLEM — Z85.3 HISTORY OF MALIGNANT NEOPLASM OF UPPER-INNER QUADRANT OF LEFT BREAST IN FEMALE, ESTROGEN RECEPTOR NEGATIVE: Status: RESOLVED | Noted: 2018-08-24 | Resolved: 2024-04-11

## 2024-04-11 NOTE — CONSULT LETTER
[Dear  ___] : Dear  [unfilled], [Consult Letter:] : I had the pleasure of evaluating your patient, [unfilled]. [Please see my note below.] : Please see my note below. [Consult Closing:] : Thank you very much for allowing me to participate in the care of this patient.  If you have any questions, please do not hesitate to contact me. [Sincerely,] : Sincerely, [FreeTextEntry2] :  Diana Gómez MD [address] [place] [address] [FreeTextEntry3] : Demetria Thurman MD Associate Chief  STACY [facility] [place]  of Medicine [facility]  [DrShawn  ___] : Dr. SHEETS

## 2024-04-11 NOTE — HISTORY OF PRESENT ILLNESS
[Disease: _____________________] : Disease: [unfilled] [T: ___] : T[unfilled] [N: ___] : N[unfilled] [M: ___] : M[unfilled] [AJCC Stage: ____] : AJCC Stage: [unfilled] [de-identified] : Left breast cancer diagnosed at the age of 53. 7/2/18 Mammogram and breast US: Left breast distortion at RA. Bilateral busters of cysts at 11:00 measuring 8mm, elongated cluster of cysts at 8:00 measuring 9mm and left breast 0.6 cm irregular hypoechoic nodule at 4:00 axis previously noted as nonspecific LN in the left axillary is less apparent. 7/9/18 Left 4:00 axis US core bx: 0.5 cm IDC, ER/IN % and HER-2 (2+) FISH negative. ALH, rare calcifications associated with carcinoma. 7/10/18 Bilateral breast MRI; multiple scattered sub-centimeters non enhancing cysts bilaterally (L>R), Left bx proven 1.2 cm irregular lobulated enhancing mass at 4:00 axis 2-3 cm FN, axillary lymphadenopathy. 7/24/18 Left lumpectomy and SLN with Dr. Ella Herrera at [facility] revealed a 1.5 cm Invasive mammary carcinoma with mixed ductal and lobular, mod to poorly differentiated with 0/3 LN, ER/IN 95% and HER-2 negative (10%) and Ki-67 1%. LCIS, classical type with focal acinar expansion. Left retro areolar margin excision revealed ADH. Oncotype DX score of 11. 9/2018 Adjuvant radiation to Left breast with 5240 cGy with Dr. Nation 11/2018 - 2/2024 Tamoxifen 10 mg BID taken for 5+ years with several brief hiatuses. She was initially followed by Dr. Bal at [facility] and then after her senior living she continued follow up with Dr. Herrera in surgery and Dr. Stewart in medical oncology at [facility]. 4/10/19 Endometrial polyp resected with benign findings. 11/30/20 Surgical hysteroscopy with endometrial biopsy: polyp of corpus uteri. 4/21/22 D&C for benign uterine polyp 6/22/22 Right breast US: 1:00 axis 1 cm FN a new oval indistinct hypoechoic mass. 6/22/22 Right breast US guided core biopsy: apocrine metaplasia lined cysts, stromal fibrosis and focal fibroadenomatoid change. 6/14/23 Benign endometrial polyp and inactive endometrium with focal stromal breakdown 2/6/24 Breast cancer Index predicts no benefit of continued endocrine treatment past 5 years with a 1.4% risk of recurrence in 5-10 years regardless of whether endocrine therapy is continued or not. [de-identified] : 1.5 invasive ductal carcinoma, SBR 7/9, 0/3 SLN, ER/RI 95%, HER-2 negative, Ki-67 10%, Oncotype = 11 (RR 7%) [de-identified] : Rubina was diagnosed with breast cancer in 2018 and underwent lumpectomy and radiation and then started tamoxifen in 2018. She has had several brief interruptions in treatment for endometrial polyps but has now completed over five years of therapy. She has tolerated the tamoxifen with difficulty as she has experienced anxiety, fatigue, and hot flashes as well as the stress of multiple occurrences of endometrial polyps.  She was last seen by Dr. Bal in 2019. She has been followed since then by Dr. Stewart at [facility]. She was initially told that she should have 10 years of endocrine therapy with tamoxifen but then in early  Dr. Stewart ordered Breast Cancer Index which predicted no benefit from greater then 5 years of endocrine therapy. Irregular periods since starting tamoxifen. LMP 10/23.  HEALTH MAINTENANCE: PCP: Dr. Daniel Joshi GYN: Dr. Diana Gómez Mammogram:  with no new findings at [facility]. Will get report Pap smear: 23 negative Bone density DEXA scan: 23 showed T scores of 0.4 in spine, -0.1 in left hip and -0.2 in right hip Colonoscopy: 18;  with one benign polyp by Dr. Myron Bach Genetic testin18 [employer] 32 gene panel negative for pathogenic mutations

## 2024-05-02 ENCOUNTER — APPOINTMENT (OUTPATIENT)
Dept: OBGYN | Facility: CLINIC | Age: 59
End: 2024-05-02
Payer: COMMERCIAL

## 2024-05-02 VITALS
BODY MASS INDEX: 23.04 KG/M2 | WEIGHT: 130 LBS | SYSTOLIC BLOOD PRESSURE: 123 MMHG | DIASTOLIC BLOOD PRESSURE: 70 MMHG | HEIGHT: 63 IN

## 2024-05-02 DIAGNOSIS — N90.5 ATROPHY OF VULVA: ICD-10-CM

## 2024-05-02 DIAGNOSIS — N76.0 ACUTE VAGINITIS: ICD-10-CM

## 2024-05-02 DIAGNOSIS — C50.912 MALIGNANT NEOPLASM OF UNSPECIFIED SITE OF LEFT FEMALE BREAST: ICD-10-CM

## 2024-05-02 DIAGNOSIS — N95.1 MENOPAUSAL AND FEMALE CLIMACTERIC STATES: ICD-10-CM

## 2024-05-02 PROCEDURE — 99396 PREV VISIT EST AGE 40-64: CPT

## 2024-05-02 PROCEDURE — 99459 PELVIC EXAMINATION: CPT

## 2024-05-02 PROCEDURE — 99214 OFFICE O/P EST MOD 30 MIN: CPT | Mod: 25

## 2024-05-02 PROCEDURE — 82270 OCCULT BLOOD FECES: CPT

## 2024-05-03 ENCOUNTER — NON-APPOINTMENT (OUTPATIENT)
Age: 59
End: 2024-05-03

## 2024-05-05 LAB
BV BACTERIA RRNA VAG QL NAA+PROBE: NOT DETECTED
C GLABRATA RNA VAG QL NAA+PROBE: NOT DETECTED
CANDIDA RRNA VAG QL PROBE: NOT DETECTED
HPV HIGH+LOW RISK DNA PNL CVX: NOT DETECTED
T VAGINALIS RRNA SPEC QL NAA+PROBE: NOT DETECTED

## 2024-05-07 LAB — CYTOLOGY CVX/VAG DOC THIN PREP: NORMAL

## 2024-05-15 ENCOUNTER — APPOINTMENT (OUTPATIENT)
Dept: OBGYN | Facility: CLINIC | Age: 59
End: 2024-05-15
Payer: COMMERCIAL

## 2024-05-15 DIAGNOSIS — N95.1 MENOPAUSAL AND FEMALE CLIMACTERIC STATES: ICD-10-CM

## 2024-05-15 DIAGNOSIS — N76.0 ACUTE VAGINITIS: ICD-10-CM

## 2024-05-15 DIAGNOSIS — R39.9 UNSPECIFIED SYMPTOMS AND SIGNS INVOLVING THE GENITOURINARY SYSTEM: ICD-10-CM

## 2024-05-15 LAB
APPEARANCE: CLEAR
BACTERIA: NEGATIVE /HPF
BILIRUBIN URINE: NEGATIVE
BLOOD URINE: NEGATIVE
CAST: 0 /LPF
COLOR: YELLOW
EPITHELIAL CELLS: 1 /HPF
GLUCOSE QUALITATIVE U: NEGATIVE MG/DL
HSV+VZV DNA SPEC QL NAA+PROBE: NOT DETECTED
KETONES URINE: NEGATIVE MG/DL
LEUKOCYTE ESTERASE URINE: NEGATIVE
MICROSCOPIC-UA: NORMAL
NITRITE URINE: NEGATIVE
PH URINE: 7.5
PROTEIN URINE: NEGATIVE MG/DL
RED BLOOD CELLS URINE: 0 /HPF
SPECIFIC GRAVITY URINE: <1.005
SPECIMEN SOURCE: NORMAL
UROBILINOGEN URINE: 0.2 MG/DL
WHITE BLOOD CELLS URINE: 0 /HPF

## 2024-05-15 PROCEDURE — 81002 URINALYSIS NONAUTO W/O SCOPE: CPT

## 2024-05-15 PROCEDURE — 99213 OFFICE O/P EST LOW 20 MIN: CPT | Mod: 25

## 2024-05-15 RX ORDER — CLOTRIMAZOLE AND BETAMETHASONE DIPROPIONATE 10; .5 MG/G; MG/G
1-0.05 CREAM TOPICAL
Qty: 1 | Refills: 0 | Status: ACTIVE | COMMUNITY
Start: 2024-05-15 | End: 1900-01-01

## 2024-05-15 RX ORDER — FLUCONAZOLE 150 MG/1
150 TABLET ORAL DAILY
Qty: 3 | Refills: 0 | Status: ACTIVE | COMMUNITY
Start: 2024-05-15 | End: 1900-01-01

## 2024-05-15 NOTE — PLAN
[FreeTextEntry1] : #Vaginitis: vag aptima and vaginal culture sent, HSV swab UA/UCx sent Rx given for Diflucan Rx given for Lotrisone  #Vaginal Atrophy:  Due to hx of Breast ca, pt declined estrogen based products Advised to use Hyalogyn and Revaree. Samples given   Encouraged OTC coconut oil or water-based lubricants   #Hot Flashes  Discussed w/pt Effexor to resolve sxs; pt declined at this time, would like time to consider   RTO as PRN

## 2024-05-15 NOTE — PHYSICAL EXAM
[Chaperone Present] : A chaperone was present in the examining room during all aspects of the physical examination [[id number]] : A chaperone was present during the pelvic exam. [Appropriately responsive] : appropriately responsive [Alert] : alert [No Acute Distress] : no acute distress [Soft] : soft [Non-tender] : non-tender [Non-distended] : non-distended [No HSM] : No HSM [No Lesions] : no lesions [No Mass] : no mass [Oriented x3] : oriented x3 [Atrophy] : atrophy [FreeTextEntry2] : Dominga Moran  [Vulvar Atrophy] : vulvar atrophy [FreeTextEntry4] : Red patch w/ small spots [FreeTextEntry5] : no discharge

## 2024-05-15 NOTE — HISTORY OF PRESENT ILLNESS
[FreeTextEntry1] : 05/15/2024. 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   Pt reports that she was experiencing vaginal burning and urinary frequency. She states that she believed she had a yeast infection and took Monistat 3 but her sxs did not resolve.   No VB or vag d/c.  Still urinary frequency.  c/o of hot flashes and mood changes and hx BR Ca.

## 2024-05-17 ENCOUNTER — NON-APPOINTMENT (OUTPATIENT)
Age: 59
End: 2024-05-17

## 2024-05-17 LAB — BACTERIA UR CULT: NORMAL

## 2024-05-18 LAB
A VAGINAE DNA VAG QL NAA+PROBE: NORMAL
BACTERIA GENITAL AEROBE CULT: NORMAL
BVAB2 DNA VAG QL NAA+PROBE: NORMAL
C KRUSEI DNA VAG QL NAA+PROBE: NEGATIVE
C TRACH RRNA SPEC QL NAA+PROBE: NEGATIVE
CANDIDA DNA VAG QL NAA+PROBE: NEGATIVE
MEGA1 DNA VAG QL NAA+PROBE: NORMAL
N GONORRHOEA RRNA SPEC QL NAA+PROBE: NEGATIVE
T VAGINALIS RRNA SPEC QL NAA+PROBE: NEGATIVE

## 2024-05-19 ENCOUNTER — NON-APPOINTMENT (OUTPATIENT)
Age: 59
End: 2024-05-19

## 2024-05-31 ENCOUNTER — APPOINTMENT (OUTPATIENT)
Dept: OBGYN | Facility: CLINIC | Age: 59
End: 2024-05-31
Payer: COMMERCIAL

## 2024-05-31 ENCOUNTER — ASOB RESULT (OUTPATIENT)
Age: 59
End: 2024-05-31

## 2024-05-31 PROCEDURE — 76830 TRANSVAGINAL US NON-OB: CPT

## 2024-06-05 ENCOUNTER — NON-APPOINTMENT (OUTPATIENT)
Age: 59
End: 2024-06-05

## 2024-06-07 ENCOUNTER — NON-APPOINTMENT (OUTPATIENT)
Age: 59
End: 2024-06-07

## 2024-06-07 DIAGNOSIS — R10.2 PELVIC AND PERINEAL PAIN: ICD-10-CM

## 2024-06-10 ENCOUNTER — NON-APPOINTMENT (OUTPATIENT)
Age: 59
End: 2024-06-10

## 2024-06-26 ENCOUNTER — APPOINTMENT (OUTPATIENT)
Dept: CT IMAGING | Facility: CLINIC | Age: 59
End: 2024-06-26

## 2024-07-25 ENCOUNTER — NON-APPOINTMENT (OUTPATIENT)
Age: 59
End: 2024-07-25

## 2025-01-15 ENCOUNTER — APPOINTMENT (OUTPATIENT)
Dept: OBGYN | Facility: CLINIC | Age: 60
End: 2025-01-15
Payer: COMMERCIAL

## 2025-01-15 ENCOUNTER — ASOB RESULT (OUTPATIENT)
Age: 60
End: 2025-01-15

## 2025-01-15 PROCEDURE — 76830 TRANSVAGINAL US NON-OB: CPT

## 2025-02-11 NOTE — PRE-ANESTHESIA EVALUATION ADULT - NSANTHBPHIGHRD_ENT_A_CORE
Reviewed UDS and KATELYN. Both updated and appropriate. Refill appropriate.    Rx sent for acute supply of tramadol--pt states she has taken it before without any issues and had relief of pain.  
No

## 2025-03-12 ENCOUNTER — NON-APPOINTMENT (OUTPATIENT)
Age: 60
End: 2025-03-12

## 2025-03-17 ENCOUNTER — APPOINTMENT (OUTPATIENT)
Dept: OBGYN | Facility: CLINIC | Age: 60
End: 2025-03-17
Payer: COMMERCIAL

## 2025-03-17 DIAGNOSIS — N95.1 MENOPAUSAL AND FEMALE CLIMACTERIC STATES: ICD-10-CM

## 2025-03-17 PROCEDURE — 99213 OFFICE O/P EST LOW 20 MIN: CPT | Mod: 95

## 2025-04-11 ENCOUNTER — APPOINTMENT (OUTPATIENT)
Dept: OBGYN | Facility: CLINIC | Age: 60
End: 2025-04-11

## (undated) DEVICE — GLV 6.5 PROTEXIS (WHITE)

## (undated) DEVICE — PACK LITHOTOMY

## (undated) DEVICE — GOWN TRIMAX LG

## (undated) DEVICE — DRSG TELFA 3 X 8

## (undated) DEVICE — CURETTE ENDOMETRIAL GYNOSAMPLER 23.6CM

## (undated) DEVICE — DRAPE LIGHT HANDLE COVER (GREEN)

## (undated) DEVICE — OS FINDER

## (undated) DEVICE — WARMING BLANKET UPPER ADULT

## (undated) DEVICE — PRESSURE INFUSOR BAG 1000ML

## (undated) DEVICE — DRAPE 1/2 SHEET 40X57"

## (undated) DEVICE — POSITIONER PATIENT SAFETY STRAP 3X60"

## (undated) DEVICE — SOL INJ NS 0.9% 500ML 1-PORT

## (undated) DEVICE — SOL IRR POUR NS 0.9% 500ML

## (undated) DEVICE — SPECIMEN CONTAINER 100ML

## (undated) DEVICE — TUBING FLUID ADMINISTRATION SET PRIM 70"